# Patient Record
Sex: MALE
[De-identification: names, ages, dates, MRNs, and addresses within clinical notes are randomized per-mention and may not be internally consistent; named-entity substitution may affect disease eponyms.]

---

## 2017-12-28 PROBLEM — Z00.00 ENCOUNTER FOR PREVENTIVE HEALTH EXAMINATION: Status: ACTIVE | Noted: 2017-12-28

## 2018-01-08 ENCOUNTER — APPOINTMENT (OUTPATIENT)
Dept: SURGERY | Facility: CLINIC | Age: 68
End: 2018-01-08
Payer: COMMERCIAL

## 2018-01-08 VITALS
HEART RATE: 64 BPM | DIASTOLIC BLOOD PRESSURE: 79 MMHG | TEMPERATURE: 97.5 F | SYSTOLIC BLOOD PRESSURE: 121 MMHG | OXYGEN SATURATION: 98 % | WEIGHT: 210 LBS | HEIGHT: 68 IN | BODY MASS INDEX: 31.83 KG/M2

## 2018-01-08 DIAGNOSIS — Z86.79 PERSONAL HISTORY OF OTHER DISEASES OF THE CIRCULATORY SYSTEM: ICD-10-CM

## 2018-01-08 DIAGNOSIS — K40.90 UNILATERAL INGUINAL HERNIA, W/OUT OBSTRUCTION OR GANGRENE, NOT SPECIFIED AS RECURRENT: ICD-10-CM

## 2018-01-08 DIAGNOSIS — K46.9 UNSPECIFIED ABDOMINAL HERNIA W/OUT OBSTRUCTION OR GANGRENE: ICD-10-CM

## 2018-01-08 DIAGNOSIS — I50.9 HEART FAILURE, UNSPECIFIED: ICD-10-CM

## 2018-01-08 DIAGNOSIS — F15.90 OTHER STIMULANT USE, UNSPECIFIED, UNCOMPLICATED: ICD-10-CM

## 2018-01-08 DIAGNOSIS — E11.9 TYPE 2 DIABETES MELLITUS W/OUT COMPLICATIONS: ICD-10-CM

## 2018-01-08 PROCEDURE — 99204 OFFICE O/P NEW MOD 45 MIN: CPT

## 2018-01-08 RX ORDER — CARVEDILOL 25 MG/1
25 TABLET, FILM COATED ORAL
Refills: 0 | Status: ACTIVE | COMMUNITY

## 2018-01-08 RX ORDER — ASPIRIN 81 MG/1
81 TABLET, COATED ORAL
Refills: 0 | Status: ACTIVE | COMMUNITY

## 2018-01-08 RX ORDER — METFORMIN HYDROCHLORIDE 1000 MG/1
1000 TABLET, EXTENDED RELEASE ORAL
Refills: 0 | Status: ACTIVE | COMMUNITY

## 2018-01-08 RX ORDER — ATORVASTATIN CALCIUM 80 MG/1
80 TABLET, FILM COATED ORAL
Refills: 0 | Status: ACTIVE | COMMUNITY

## 2018-01-08 RX ORDER — SPIRONOLACTONE 25 MG/1
25 TABLET ORAL
Refills: 0 | Status: ACTIVE | COMMUNITY

## 2018-01-08 RX ORDER — FUROSEMIDE 40 MG/1
40 TABLET ORAL
Refills: 0 | Status: ACTIVE | COMMUNITY

## 2018-01-08 RX ORDER — ENALAPRIL MALEATE 5 MG/1
5 TABLET ORAL
Refills: 0 | Status: ACTIVE | COMMUNITY

## 2018-01-08 RX ORDER — ALLOPURINOL 300 MG/1
300 TABLET ORAL
Refills: 0 | Status: ACTIVE | COMMUNITY

## 2024-07-09 ENCOUNTER — INPATIENT (INPATIENT)
Facility: HOSPITAL | Age: 74
LOS: 2 days | Discharge: HOME | End: 2024-07-12
Attending: STUDENT IN AN ORGANIZED HEALTH CARE EDUCATION/TRAINING PROGRAM | Admitting: STUDENT IN AN ORGANIZED HEALTH CARE EDUCATION/TRAINING PROGRAM
Payer: MEDICARE

## 2024-07-09 VITALS
HEART RATE: 77 BPM | OXYGEN SATURATION: 98 % | TEMPERATURE: 98 F | SYSTOLIC BLOOD PRESSURE: 108 MMHG | DIASTOLIC BLOOD PRESSURE: 74 MMHG | WEIGHT: 229.94 LBS | RESPIRATION RATE: 20 BRPM | HEIGHT: 71 IN

## 2024-07-09 LAB
ALBUMIN SERPL ELPH-MCNC: 3.5 G/DL — SIGNIFICANT CHANGE UP (ref 3.3–5)
ALP SERPL-CCNC: 49 U/L — SIGNIFICANT CHANGE UP (ref 40–120)
ALT FLD-CCNC: 14 U/L — SIGNIFICANT CHANGE UP (ref 12–78)
ANION GAP SERPL CALC-SCNC: 9 MMOL/L — SIGNIFICANT CHANGE UP (ref 5–17)
AST SERPL-CCNC: 14 U/L — LOW (ref 15–37)
BASOPHILS # BLD AUTO: 0.03 K/UL — SIGNIFICANT CHANGE UP (ref 0–0.2)
BASOPHILS NFR BLD AUTO: 0.5 % — SIGNIFICANT CHANGE UP (ref 0–2)
BILIRUB SERPL-MCNC: 0.9 MG/DL — SIGNIFICANT CHANGE UP (ref 0.2–1.2)
BUN SERPL-MCNC: 24 MG/DL — HIGH (ref 7–23)
CALCIUM SERPL-MCNC: 9.1 MG/DL — SIGNIFICANT CHANGE UP (ref 8.5–10.1)
CHLORIDE SERPL-SCNC: 109 MMOL/L — HIGH (ref 96–108)
CO2 SERPL-SCNC: 23 MMOL/L — SIGNIFICANT CHANGE UP (ref 22–31)
CREAT SERPL-MCNC: 1.43 MG/DL — HIGH (ref 0.5–1.3)
EGFR: 52 ML/MIN/1.73M2 — LOW
EOSINOPHIL # BLD AUTO: 0.24 K/UL — SIGNIFICANT CHANGE UP (ref 0–0.5)
EOSINOPHIL NFR BLD AUTO: 4.2 % — SIGNIFICANT CHANGE UP (ref 0–6)
GLUCOSE SERPL-MCNC: 130 MG/DL — HIGH (ref 70–99)
HCT VFR BLD CALC: 38.9 % — LOW (ref 39–50)
HGB BLD-MCNC: 12.9 G/DL — LOW (ref 13–17)
IMM GRANULOCYTES NFR BLD AUTO: 0.3 % — SIGNIFICANT CHANGE UP (ref 0–0.9)
LACTATE SERPL-SCNC: 3 MMOL/L — HIGH (ref 0.7–2)
LACTATE SERPL-SCNC: 3.3 MMOL/L — HIGH (ref 0.7–2)
LYMPHOCYTES # BLD AUTO: 1.52 K/UL — SIGNIFICANT CHANGE UP (ref 1–3.3)
LYMPHOCYTES # BLD AUTO: 26.3 % — SIGNIFICANT CHANGE UP (ref 13–44)
MCHC RBC-ENTMCNC: 30.1 PG — SIGNIFICANT CHANGE UP (ref 27–34)
MCHC RBC-ENTMCNC: 33.2 G/DL — SIGNIFICANT CHANGE UP (ref 32–36)
MCV RBC AUTO: 90.7 FL — SIGNIFICANT CHANGE UP (ref 80–100)
MONOCYTES # BLD AUTO: 0.49 K/UL — SIGNIFICANT CHANGE UP (ref 0–0.9)
MONOCYTES NFR BLD AUTO: 8.5 % — SIGNIFICANT CHANGE UP (ref 2–14)
NEUTROPHILS # BLD AUTO: 3.48 K/UL — SIGNIFICANT CHANGE UP (ref 1.8–7.4)
NEUTROPHILS NFR BLD AUTO: 60.2 % — SIGNIFICANT CHANGE UP (ref 43–77)
NRBC # BLD: 0 /100 WBCS — SIGNIFICANT CHANGE UP (ref 0–0)
NT-PROBNP SERPL-SCNC: 5480 PG/ML — HIGH (ref 0–125)
PLATELET # BLD AUTO: 286 K/UL — SIGNIFICANT CHANGE UP (ref 150–400)
POTASSIUM SERPL-MCNC: 3.7 MMOL/L — SIGNIFICANT CHANGE UP (ref 3.5–5.3)
POTASSIUM SERPL-SCNC: 3.7 MMOL/L — SIGNIFICANT CHANGE UP (ref 3.5–5.3)
PROT SERPL-MCNC: 6.7 GM/DL — SIGNIFICANT CHANGE UP (ref 6–8.3)
RBC # BLD: 4.29 M/UL — SIGNIFICANT CHANGE UP (ref 4.2–5.8)
RBC # FLD: 15.5 % — HIGH (ref 10.3–14.5)
SODIUM SERPL-SCNC: 141 MMOL/L — SIGNIFICANT CHANGE UP (ref 135–145)
TROPONIN I, HIGH SENSITIVITY RESULT: 35.5 NG/L — SIGNIFICANT CHANGE UP
WBC # BLD: 5.78 K/UL — SIGNIFICANT CHANGE UP (ref 3.8–10.5)
WBC # FLD AUTO: 5.78 K/UL — SIGNIFICANT CHANGE UP (ref 3.8–10.5)

## 2024-07-09 PROCEDURE — 93010 ELECTROCARDIOGRAM REPORT: CPT

## 2024-07-09 PROCEDURE — 99285 EMERGENCY DEPT VISIT HI MDM: CPT

## 2024-07-09 PROCEDURE — 71046 X-RAY EXAM CHEST 2 VIEWS: CPT | Mod: 26

## 2024-07-09 PROCEDURE — 99223 1ST HOSP IP/OBS HIGH 75: CPT

## 2024-07-09 RX ORDER — FUROSEMIDE 10 MG/ML
40 INJECTION, SOLUTION INTRAMUSCULAR; INTRAVENOUS ONCE
Refills: 0 | Status: COMPLETED | OUTPATIENT
Start: 2024-07-09 | End: 2024-07-09

## 2024-07-09 RX ORDER — ACETAMINOPHEN 325 MG
650 TABLET ORAL EVERY 6 HOURS
Refills: 0 | Status: DISCONTINUED | OUTPATIENT
Start: 2024-07-09 | End: 2024-07-12

## 2024-07-09 RX ORDER — MAGNESIUM, ALUMINUM HYDROXIDE 400-400
30 TABLET,CHEWABLE ORAL EVERY 4 HOURS
Refills: 0 | Status: DISCONTINUED | OUTPATIENT
Start: 2024-07-09 | End: 2024-07-12

## 2024-07-09 RX ORDER — ONDANSETRON HYDROCHLORIDE 2 MG/ML
4 INJECTION INTRAMUSCULAR; INTRAVENOUS EVERY 8 HOURS
Refills: 0 | Status: DISCONTINUED | OUTPATIENT
Start: 2024-07-09 | End: 2024-07-12

## 2024-07-09 RX ADMIN — FUROSEMIDE 40 MILLIGRAM(S): 10 INJECTION, SOLUTION INTRAMUSCULAR; INTRAVENOUS at 18:06

## 2024-07-09 NOTE — ED PROVIDER NOTE - CLINICAL SUMMARY MEDICAL DECISION MAKING FREE TEXT BOX
72 yo male PMH CHF, HTN, HLD, DM2 presenting to ED c/o bilat LE edema, abd swelling, and RIVAS. States has hx of CHF, recent echo showing EF 25%, states is supposed to be taking torsemide but makes him have to urinate frequently so does not always take it. Denies chest pain, palpitations, fevers/chills, nausea/vomiting, abd pain, lightheadedness/dizziness, LOC. On exam RRR, +S1/S2, +2 bilat LE edema, abd soft, round, not distended, nontender on palpation, no palpable masses, l/s equal and clr bilat. Will obtain EKG/trop/BNP to assess ACS/arrhythmia/CHF. Most likely fluid overload 2/2 to not taking his diuretics. will obtain cmp to assess electrolyte imbalance, cbc for leukocytosis/anemia. Will order lasix and reassess. Dispo pending results.

## 2024-07-09 NOTE — ED PROVIDER NOTE - PHYSICAL EXAMINATION
CONSTITUTIONAL: Appears well, in no apparent distress  HEAD: Normocephalic, no obvious signs of trauma  EENT: PERRL, EOMI w/o pain, nares patent no drainage, no pharyngeal erythema, swelling, or exudates  NECK: Trachea midline, no goiter  RESP: L/S equal clr, bilat, apices and bases, no accessory muscle use, speaking full sentences  CARDIC: RRR, +S1/S2, no peripheral edema  GI: ABD soft, nondistended, nontender on palpation, no palpable masses, negative Escobedo's Sign  : No CVA Tenderness  MSK: 5/5 strength extremities x 4, full ROM without pain, no spinal tenderness on palpation  NEURO: A&OX4, No focal motor deficits/weakness, no sensory deficits, no slurred speech, no facial droop, normal gait  Psych: Appropriate mood/affect, no SI/HI CONSTITUTIONAL: Appears well, in no apparent distress  HEAD: Normocephalic, no obvious signs of trauma  EENT: PERRL, EOMI w/o pain, nares patent no drainage, no pharyngeal erythema, swelling, or exudates  NECK: Trachea midline, no goiter  RESP: L/S equal clr, bilat, apices and bases, no accessory muscle use, speaking full sentences  CARDIC: RRR, +S1/S2, +2 bilat LE edema  GI: ABD soft, round, nondistended, nontender on palpation, no palpable masses  MSK: 5/5 strength extremities x 4, full ROM without pain, no spinal tenderness on palpation  NEURO: A&OX4, No focal motor deficits/weakness, no sensory deficits, no slurred speech, no facial droop, normal gait

## 2024-07-09 NOTE — ED PROVIDER NOTE - OBJECTIVE STATEMENT
72 yo male PMH CHF, HTN, HLD, DM2 presenting to ED c/o bilat LE edema and RIVAS 72 yo male PMH CHF, HTN, HLD, DM2 presenting to ED c/o bilat LE edema, abd swelling, and RIVAS. States has hx of CHF, recent echo showing EF 25%, states is supposed to be taking torsemide but makes him have to urinate frequently so does not always take it. Denies chest pain, palpitations, fevers/chills, nausea/vomiting, abd pain, lightheadedness/dizziness, LOC.

## 2024-07-09 NOTE — ED PROVIDER NOTE - ED STEMI HIDDEN
hide Ivermectin Counseling:  Patient instructed to take medication on an empty stomach with a full glass of water.  Patient informed of potential adverse effects including but not limited to nausea, diarrhea, dizziness, itching, and swelling of the extremities or lymph nodes.  The patient verbalized understanding of the proper use and possible adverse effects of ivermectin.  All of the patient's questions and concerns were addressed.

## 2024-07-09 NOTE — PATIENT PROFILE ADULT - NSPROMEDSADMININFO_GEN_A_NUR
Please return to ED if Terence is having trouble walking, fever, increasing swelling and pain not relieved with tylenol/ motrin or if Terence is having similar rashes or f you have any other concerns.  Please give zyrtec as instructed for itching and continue applying hydrocortisone to the bite for next 2 days until the swelling starts coming down   no concerns

## 2024-07-09 NOTE — ED PROVIDER NOTE - PROGRESS NOTE DETAILS
Joyce: Pt care signed out to me at change of shift, pending rpt lactate and re-eval s/p Lasix. Rpt lactate 3.0 (down trending from prior value). On re-eval, resting comfortably. Shared decision making w/ pt, pt states he would be more comfortable w/ admission. Will admit to Tele (d/w Dr Hill) for CHF exacerbation. Pt, family updated to results, admission. They understand / agree w/ this plan.

## 2024-07-09 NOTE — ED ADULT NURSE NOTE - ED STAT RN HANDOFF DETAILS
Report endorsed to oncoming RN  Nichelle for my break coverage. Report given to covering RN and patient informed during rounding Safety checks compld this shift/Safety rounds completed hourly.  Fall +skin precs in place. Any issues endorsed to oncoming RN for follow up. RN Assumed patient's care for coverage.

## 2024-07-09 NOTE — ED ADULT NURSE NOTE - OBJECTIVE STATEMENT
A&OX4 patient C/O SOB/ b/l leg swelling/ productive cough with clear phlegm denies fevers last seen in ED 2X weeks ago. patient states only took water pill 2x since discharge. pmh HF/ HTN/DM2/ HLD

## 2024-07-09 NOTE — ED PROVIDER NOTE - ATTENDING APP SHARED VISIT CONTRIBUTION OF CARE
73M pmhx HTN, DM, CHF, who presents for evaluation of chronic progressive b/l lower extremity edema and sensation of abd fullness and shortness of breath with ambulation - states he does not regularly take his torsemide 20mg due to it causing urinary urgency. Denies recent illness, abd pain or chest pain. States follows with cardiologist in [facility]  On exam pt is aox3, nad, heart rrr, lungs cta b/l, abd soft ntnd, + pitting edema to b/l anterior shins  plan - check troponin to assess for cardiac strain, check bnp, check cxr to assess for signs of pulm edema, clinical mild fluid overload with normal work of breathing, on room air. trial dose of lasix and re-eval for improvement, if still sob with exertion or not improved, will consider admission for continued diuresis , signed out to afternoon team for re-eval.

## 2024-07-09 NOTE — ED ADULT NURSE NOTE - ED STAT RN HANDOFF DETAILS 2
Report given to Ivette HAMMOND 1C pt aox3 vss iv site patent no s/s infection/infiltration pt denies pain skin intact family at bedside

## 2024-07-09 NOTE — PATIENT PROFILE ADULT - FALL HARM RISK - HARM RISK INTERVENTIONS

## 2024-07-10 ENCOUNTER — RESULT REVIEW (OUTPATIENT)
Age: 74
End: 2024-07-10

## 2024-07-10 DIAGNOSIS — I10 ESSENTIAL (PRIMARY) HYPERTENSION: ICD-10-CM

## 2024-07-10 DIAGNOSIS — I50.9 HEART FAILURE, UNSPECIFIED: ICD-10-CM

## 2024-07-10 DIAGNOSIS — N40.0 BENIGN PROSTATIC HYPERPLASIA WITHOUT LOWER URINARY TRACT SYMPTOMS: ICD-10-CM

## 2024-07-10 DIAGNOSIS — Z29.9 ENCOUNTER FOR PROPHYLACTIC MEASURES, UNSPECIFIED: ICD-10-CM

## 2024-07-10 DIAGNOSIS — E11.9 TYPE 2 DIABETES MELLITUS WITHOUT COMPLICATIONS: ICD-10-CM

## 2024-07-10 DIAGNOSIS — E78.5 HYPERLIPIDEMIA, UNSPECIFIED: ICD-10-CM

## 2024-07-10 DIAGNOSIS — N39.0 URINARY TRACT INFECTION, SITE NOT SPECIFIED: ICD-10-CM

## 2024-07-10 DIAGNOSIS — N18.9 CHRONIC KIDNEY DISEASE, UNSPECIFIED: ICD-10-CM

## 2024-07-10 LAB
A1C WITH ESTIMATED AVERAGE GLUCOSE RESULT: 8.3 % — HIGH (ref 4–5.6)
ALBUMIN SERPL ELPH-MCNC: 3.8 G/DL — SIGNIFICANT CHANGE UP (ref 3.3–5)
ALBUMIN, RANDOM URINE: 3.9 MG/DL — SIGNIFICANT CHANGE UP
ALBUMIN/CREATININE RATIO (ACR): 75 MG/G — HIGH (ref 0–30)
ALP SERPL-CCNC: 63 U/L — SIGNIFICANT CHANGE UP (ref 40–120)
ALT FLD-CCNC: 16 U/L — SIGNIFICANT CHANGE UP (ref 12–78)
ANION GAP SERPL CALC-SCNC: 7 MMOL/L — SIGNIFICANT CHANGE UP (ref 5–17)
ANION GAP SERPL CALC-SCNC: 9 MMOL/L — SIGNIFICANT CHANGE UP (ref 5–17)
APPEARANCE UR: CLEAR — SIGNIFICANT CHANGE UP
AST SERPL-CCNC: 17 U/L — SIGNIFICANT CHANGE UP (ref 15–37)
BASOPHILS # BLD AUTO: 0.04 K/UL — SIGNIFICANT CHANGE UP (ref 0–0.2)
BASOPHILS NFR BLD AUTO: 0.5 % — SIGNIFICANT CHANGE UP (ref 0–2)
BILIRUB SERPL-MCNC: 1 MG/DL — SIGNIFICANT CHANGE UP (ref 0.2–1.2)
BILIRUB UR-MCNC: NEGATIVE — SIGNIFICANT CHANGE UP
BUN SERPL-MCNC: 23 MG/DL — SIGNIFICANT CHANGE UP (ref 7–23)
BUN SERPL-MCNC: 24 MG/DL — HIGH (ref 7–23)
CALCIUM SERPL-MCNC: 9.5 MG/DL — SIGNIFICANT CHANGE UP (ref 8.5–10.1)
CALCIUM SERPL-MCNC: 9.7 MG/DL — SIGNIFICANT CHANGE UP (ref 8.5–10.1)
CHLORIDE SERPL-SCNC: 107 MMOL/L — SIGNIFICANT CHANGE UP (ref 96–108)
CHLORIDE SERPL-SCNC: 108 MMOL/L — SIGNIFICANT CHANGE UP (ref 96–108)
CHOLEST SERPL-MCNC: 77 MG/DL — SIGNIFICANT CHANGE UP
CO2 SERPL-SCNC: 23 MMOL/L — SIGNIFICANT CHANGE UP (ref 22–31)
CO2 SERPL-SCNC: 24 MMOL/L — SIGNIFICANT CHANGE UP (ref 22–31)
COLOR SPEC: YELLOW — SIGNIFICANT CHANGE UP
CREAT ?TM UR-MCNC: 52 MG/DL — SIGNIFICANT CHANGE UP
CREAT SERPL-MCNC: 1.23 MG/DL — SIGNIFICANT CHANGE UP (ref 0.5–1.3)
CREAT SERPL-MCNC: 1.23 MG/DL — SIGNIFICANT CHANGE UP (ref 0.5–1.3)
DIFF PNL FLD: NEGATIVE — SIGNIFICANT CHANGE UP
EGFR: 62 ML/MIN/1.73M2 — SIGNIFICANT CHANGE UP
EGFR: 62 ML/MIN/1.73M2 — SIGNIFICANT CHANGE UP
EOSINOPHIL # BLD AUTO: 0.28 K/UL — SIGNIFICANT CHANGE UP (ref 0–0.5)
EOSINOPHIL NFR BLD AUTO: 3.6 % — SIGNIFICANT CHANGE UP (ref 0–6)
ESTIMATED AVERAGE GLUCOSE: 192 MG/DL — HIGH (ref 68–114)
GLUCOSE BLDC GLUCOMTR-MCNC: 130 MG/DL — HIGH (ref 70–99)
GLUCOSE BLDC GLUCOMTR-MCNC: 133 MG/DL — HIGH (ref 70–99)
GLUCOSE BLDC GLUCOMTR-MCNC: 133 MG/DL — HIGH (ref 70–99)
GLUCOSE BLDC GLUCOMTR-MCNC: 185 MG/DL — HIGH (ref 70–99)
GLUCOSE SERPL-MCNC: 156 MG/DL — HIGH (ref 70–99)
GLUCOSE SERPL-MCNC: 157 MG/DL — HIGH (ref 70–99)
GLUCOSE UR QL: NEGATIVE MG/DL — SIGNIFICANT CHANGE UP
HCT VFR BLD CALC: 42.4 % — SIGNIFICANT CHANGE UP (ref 39–50)
HDLC SERPL-MCNC: 29 MG/DL — LOW
HGB BLD-MCNC: 13.7 G/DL — SIGNIFICANT CHANGE UP (ref 13–17)
IMM GRANULOCYTES NFR BLD AUTO: 0.4 % — SIGNIFICANT CHANGE UP (ref 0–0.9)
KETONES UR-MCNC: NEGATIVE MG/DL — SIGNIFICANT CHANGE UP
LACTATE SERPL-SCNC: 1.7 MMOL/L — SIGNIFICANT CHANGE UP (ref 0.7–2)
LEUKOCYTE ESTERASE UR-ACNC: NEGATIVE — SIGNIFICANT CHANGE UP
LIPID PNL WITH DIRECT LDL SERPL: 30 MG/DL — SIGNIFICANT CHANGE UP
LYMPHOCYTES # BLD AUTO: 1.11 K/UL — SIGNIFICANT CHANGE UP (ref 1–3.3)
LYMPHOCYTES # BLD AUTO: 14.1 % — SIGNIFICANT CHANGE UP (ref 13–44)
MAGNESIUM SERPL-MCNC: 1 MG/DL — CRITICAL LOW (ref 1.6–2.6)
MCHC RBC-ENTMCNC: 29.6 PG — SIGNIFICANT CHANGE UP (ref 27–34)
MCHC RBC-ENTMCNC: 32.3 G/DL — SIGNIFICANT CHANGE UP (ref 32–36)
MCV RBC AUTO: 91.6 FL — SIGNIFICANT CHANGE UP (ref 80–100)
MONOCYTES # BLD AUTO: 0.56 K/UL — SIGNIFICANT CHANGE UP (ref 0–0.9)
MONOCYTES NFR BLD AUTO: 7.1 % — SIGNIFICANT CHANGE UP (ref 2–14)
NEUTROPHILS # BLD AUTO: 5.86 K/UL — SIGNIFICANT CHANGE UP (ref 1.8–7.4)
NEUTROPHILS NFR BLD AUTO: 74.3 % — SIGNIFICANT CHANGE UP (ref 43–77)
NITRITE UR-MCNC: NEGATIVE — SIGNIFICANT CHANGE UP
NON HDL CHOLESTEROL: 47 MG/DL — SIGNIFICANT CHANGE UP
NRBC # BLD: 0 /100 WBCS — SIGNIFICANT CHANGE UP (ref 0–0)
PH UR: 5.5 — SIGNIFICANT CHANGE UP (ref 5–8)
PHOSPHATE SERPL-MCNC: 3.2 MG/DL — SIGNIFICANT CHANGE UP (ref 2.5–4.5)
PLATELET # BLD AUTO: 278 K/UL — SIGNIFICANT CHANGE UP (ref 150–400)
POTASSIUM SERPL-MCNC: 3.5 MMOL/L — SIGNIFICANT CHANGE UP (ref 3.5–5.3)
POTASSIUM SERPL-MCNC: 3.7 MMOL/L — SIGNIFICANT CHANGE UP (ref 3.5–5.3)
POTASSIUM SERPL-SCNC: 3.5 MMOL/L — SIGNIFICANT CHANGE UP (ref 3.5–5.3)
POTASSIUM SERPL-SCNC: 3.7 MMOL/L — SIGNIFICANT CHANGE UP (ref 3.5–5.3)
PROT SERPL-MCNC: 7 GM/DL — SIGNIFICANT CHANGE UP (ref 6–8.3)
PROT UR-MCNC: NEGATIVE MG/DL — SIGNIFICANT CHANGE UP
RBC # BLD: 4.63 M/UL — SIGNIFICANT CHANGE UP (ref 4.2–5.8)
RBC # FLD: 15.6 % — HIGH (ref 10.3–14.5)
SODIUM SERPL-SCNC: 138 MMOL/L — SIGNIFICANT CHANGE UP (ref 135–145)
SODIUM SERPL-SCNC: 140 MMOL/L — SIGNIFICANT CHANGE UP (ref 135–145)
SP GR SPEC: 1.01 — SIGNIFICANT CHANGE UP (ref 1–1.03)
TRIGL SERPL-MCNC: 87 MG/DL — SIGNIFICANT CHANGE UP
UROBILINOGEN FLD QL: 0.2 MG/DL — SIGNIFICANT CHANGE UP (ref 0.2–1)
WBC # BLD: 7.88 K/UL — SIGNIFICANT CHANGE UP (ref 3.8–10.5)
WBC # FLD AUTO: 7.88 K/UL — SIGNIFICANT CHANGE UP (ref 3.8–10.5)

## 2024-07-10 PROCEDURE — 93306 TTE W/DOPPLER COMPLETE: CPT | Mod: 26

## 2024-07-10 PROCEDURE — 99223 1ST HOSP IP/OBS HIGH 75: CPT

## 2024-07-10 PROCEDURE — 74176 CT ABD & PELVIS W/O CONTRAST: CPT | Mod: 26

## 2024-07-10 PROCEDURE — 99232 SBSQ HOSP IP/OBS MODERATE 35: CPT

## 2024-07-10 PROCEDURE — 93356 MYOCRD STRAIN IMG SPCKL TRCK: CPT

## 2024-07-10 RX ORDER — ATORVASTATIN CALCIUM 20 MG/1
80 TABLET, FILM COATED ORAL AT BEDTIME
Refills: 0 | Status: DISCONTINUED | OUTPATIENT
Start: 2024-07-10 | End: 2024-07-12

## 2024-07-10 RX ORDER — CEFEPIME 1 G/1
1000 INJECTION, POWDER, FOR SOLUTION INTRAMUSCULAR; INTRAVENOUS EVERY 8 HOURS
Refills: 0 | Status: DISCONTINUED | OUTPATIENT
Start: 2024-07-10 | End: 2024-07-12

## 2024-07-10 RX ORDER — TRAMADOL HYDROCHLORIDE 50 MG/1
1 TABLET, FILM COATED ORAL
Refills: 0 | DISCHARGE

## 2024-07-10 RX ORDER — METFORMIN HYDROCHLORIDE 850 MG/1
1 TABLET, FILM COATED ORAL
Refills: 0 | DISCHARGE

## 2024-07-10 RX ORDER — GLIPIZIDE 5 MG
1 TABLET ORAL
Refills: 0 | DISCHARGE

## 2024-07-10 RX ORDER — DEXTROSE MONOHYDRATE AND SODIUM CHLORIDE 5; .3 G/100ML; G/100ML
1000 INJECTION, SOLUTION INTRAVENOUS
Refills: 0 | Status: DISCONTINUED | OUTPATIENT
Start: 2024-07-10 | End: 2024-07-12

## 2024-07-10 RX ORDER — CEFEPIME 1 G/1
INJECTION, POWDER, FOR SOLUTION INTRAMUSCULAR; INTRAVENOUS
Refills: 0 | Status: DISCONTINUED | OUTPATIENT
Start: 2024-07-10 | End: 2024-07-12

## 2024-07-10 RX ORDER — SENNOSIDES 8.6 MG
2 TABLET ORAL AT BEDTIME
Refills: 0 | Status: DISCONTINUED | OUTPATIENT
Start: 2024-07-10 | End: 2024-07-12

## 2024-07-10 RX ORDER — DEXTROSE 30 % IN WATER 30 %
25 VIAL (ML) INTRAVENOUS ONCE
Refills: 0 | Status: DISCONTINUED | OUTPATIENT
Start: 2024-07-10 | End: 2024-07-12

## 2024-07-10 RX ORDER — FUROSEMIDE 10 MG/ML
60 INJECTION, SOLUTION INTRAMUSCULAR; INTRAVENOUS
Refills: 0 | Status: DISCONTINUED | OUTPATIENT
Start: 2024-07-10 | End: 2024-07-12

## 2024-07-10 RX ORDER — EMPAGLIFLOZIN 25 MG/1
1 TABLET, FILM COATED ORAL
Refills: 0 | DISCHARGE

## 2024-07-10 RX ORDER — ALLOPURINOL 300 MG/1
300 TABLET ORAL DAILY
Refills: 0 | Status: DISCONTINUED | OUTPATIENT
Start: 2024-07-10 | End: 2024-07-12

## 2024-07-10 RX ORDER — CEFEPIME 1 G/1
1000 INJECTION, POWDER, FOR SOLUTION INTRAMUSCULAR; INTRAVENOUS ONCE
Refills: 0 | Status: COMPLETED | OUTPATIENT
Start: 2024-07-10 | End: 2024-07-10

## 2024-07-10 RX ORDER — TAMSULOSIN HYDROCHLORIDE 0.4 MG/1
0.4 CAPSULE ORAL AT BEDTIME
Refills: 0 | Status: DISCONTINUED | OUTPATIENT
Start: 2024-07-10 | End: 2024-07-12

## 2024-07-10 RX ORDER — BISACODYL 5 MG
10 TABLET, DELAYED RELEASE (ENTERIC COATED) ORAL DAILY
Refills: 0 | Status: DISCONTINUED | OUTPATIENT
Start: 2024-07-10 | End: 2024-07-12

## 2024-07-10 RX ORDER — GLUCAGON HYDROCHLORIDE 1 MG/ML
1 INJECTION, POWDER, FOR SOLUTION INTRAMUSCULAR; INTRAVENOUS; SUBCUTANEOUS ONCE
Refills: 0 | Status: DISCONTINUED | OUTPATIENT
Start: 2024-07-10 | End: 2024-07-12

## 2024-07-10 RX ORDER — SPIRONOLACTONE 25 MG/1
25 TABLET ORAL DAILY
Refills: 0 | Status: DISCONTINUED | OUTPATIENT
Start: 2024-07-10 | End: 2024-07-12

## 2024-07-10 RX ORDER — MAGNESIUM SULFATE 100 %
2 POWDER (GRAM) MISCELLANEOUS ONCE
Refills: 0 | Status: COMPLETED | OUTPATIENT
Start: 2024-07-10 | End: 2024-07-10

## 2024-07-10 RX ORDER — CARVEDILOL PHOSPHATE 80 MG/1
25 CAPSULE, EXTENDED RELEASE ORAL EVERY 12 HOURS
Refills: 0 | Status: DISCONTINUED | OUTPATIENT
Start: 2024-07-10 | End: 2024-07-12

## 2024-07-10 RX ORDER — INSULIN LISPRO 100 [IU]/ML
INJECTION, SOLUTION SUBCUTANEOUS AT BEDTIME
Refills: 0 | Status: DISCONTINUED | OUTPATIENT
Start: 2024-07-10 | End: 2024-07-12

## 2024-07-10 RX ORDER — TORSEMIDE 20 MG/1
1 TABLET ORAL
Refills: 0 | DISCHARGE

## 2024-07-10 RX ORDER — DEXTROSE 30 % IN WATER 30 %
15 VIAL (ML) INTRAVENOUS ONCE
Refills: 0 | Status: DISCONTINUED | OUTPATIENT
Start: 2024-07-10 | End: 2024-07-12

## 2024-07-10 RX ORDER — INSULIN LISPRO 100 [IU]/ML
INJECTION, SOLUTION SUBCUTANEOUS
Refills: 0 | Status: DISCONTINUED | OUTPATIENT
Start: 2024-07-10 | End: 2024-07-12

## 2024-07-10 RX ORDER — HEPARIN SODIUM 50 [USP'U]/ML
5000 INJECTION, SOLUTION INTRAVENOUS EVERY 12 HOURS
Refills: 0 | Status: DISCONTINUED | OUTPATIENT
Start: 2024-07-10 | End: 2024-07-12

## 2024-07-10 RX ORDER — FUROSEMIDE 10 MG/ML
20 INJECTION, SOLUTION INTRAMUSCULAR; INTRAVENOUS EVERY 12 HOURS
Refills: 0 | Status: DISCONTINUED | OUTPATIENT
Start: 2024-07-10 | End: 2024-07-10

## 2024-07-10 RX ORDER — POLYETHYLENE GLYCOL 3350 1 G/G
17 POWDER ORAL
Refills: 0 | Status: DISCONTINUED | OUTPATIENT
Start: 2024-07-10 | End: 2024-07-12

## 2024-07-10 RX ORDER — SACUBITRIL AND VALSARTAN 97; 103 MG/1; MG/1
1 TABLET, FILM COATED ORAL
Refills: 0 | Status: DISCONTINUED | OUTPATIENT
Start: 2024-07-10 | End: 2024-07-12

## 2024-07-10 RX ORDER — DEXTROSE 30 % IN WATER 30 %
12.5 VIAL (ML) INTRAVENOUS ONCE
Refills: 0 | Status: DISCONTINUED | OUTPATIENT
Start: 2024-07-10 | End: 2024-07-12

## 2024-07-10 RX ADMIN — SACUBITRIL AND VALSARTAN 1 TABLET(S): 97; 103 TABLET, FILM COATED ORAL at 18:37

## 2024-07-10 RX ADMIN — Medication 25 GRAM(S): at 10:09

## 2024-07-10 RX ADMIN — ALLOPURINOL 300 MILLIGRAM(S): 300 TABLET ORAL at 12:23

## 2024-07-10 RX ADMIN — ATORVASTATIN CALCIUM 80 MILLIGRAM(S): 20 TABLET, FILM COATED ORAL at 22:20

## 2024-07-10 RX ADMIN — CEFEPIME 100 MILLIGRAM(S): 1 INJECTION, POWDER, FOR SOLUTION INTRAMUSCULAR; INTRAVENOUS at 08:52

## 2024-07-10 RX ADMIN — SACUBITRIL AND VALSARTAN 1 TABLET(S): 97; 103 TABLET, FILM COATED ORAL at 06:10

## 2024-07-10 RX ADMIN — Medication 25 GRAM(S): at 16:10

## 2024-07-10 RX ADMIN — FUROSEMIDE 20 MILLIGRAM(S): 10 INJECTION, SOLUTION INTRAMUSCULAR; INTRAVENOUS at 06:11

## 2024-07-10 RX ADMIN — CEFEPIME 100 MILLIGRAM(S): 1 INJECTION, POWDER, FOR SOLUTION INTRAMUSCULAR; INTRAVENOUS at 22:21

## 2024-07-10 RX ADMIN — SPIRONOLACTONE 25 MILLIGRAM(S): 25 TABLET ORAL at 06:10

## 2024-07-10 RX ADMIN — TAMSULOSIN HYDROCHLORIDE 0.4 MILLIGRAM(S): 0.4 CAPSULE ORAL at 22:20

## 2024-07-10 RX ADMIN — HEPARIN SODIUM 5000 UNIT(S): 50 INJECTION, SOLUTION INTRAVENOUS at 18:37

## 2024-07-10 RX ADMIN — CARVEDILOL PHOSPHATE 25 MILLIGRAM(S): 80 CAPSULE, EXTENDED RELEASE ORAL at 18:37

## 2024-07-10 RX ADMIN — FUROSEMIDE 60 MILLIGRAM(S): 10 INJECTION, SOLUTION INTRAMUSCULAR; INTRAVENOUS at 18:38

## 2024-07-10 RX ADMIN — HEPARIN SODIUM 5000 UNIT(S): 50 INJECTION, SOLUTION INTRAVENOUS at 06:11

## 2024-07-10 RX ADMIN — CARVEDILOL PHOSPHATE 25 MILLIGRAM(S): 80 CAPSULE, EXTENDED RELEASE ORAL at 06:10

## 2024-07-10 RX ADMIN — CEFEPIME 100 MILLIGRAM(S): 1 INJECTION, POWDER, FOR SOLUTION INTRAMUSCULAR; INTRAVENOUS at 17:18

## 2024-07-10 RX ADMIN — Medication 10 MILLIGRAM(S): at 12:23

## 2024-07-10 RX ADMIN — Medication 5 MILLIGRAM(S): at 12:23

## 2024-07-10 NOTE — H&P ADULT - NSICDXPASTMEDICALHX_GEN_ALL_CORE_FT
PAST MEDICAL HISTORY:  Acute on chronic systolic congestive heart failure     BPH (benign prostatic hyperplasia)     DM (diabetes mellitus)     HLD (hyperlipidemia)     HTN (hypertension)

## 2024-07-10 NOTE — H&P ADULT - NSHPPHYSICALEXAM_GEN_ALL_CORE
T(C): 36.9 (07-09-24 @ 23:34), Max: 36.9 (07-09-24 @ 23:34)  HR: 81 (07-09-24 @ 23:34) (72 - 81)  BP: 108/75 (07-09-24 @ 23:34) (108/74 - 109/80)  RR: 20 (07-09-24 @ 23:34) (18 - 20)  SpO2: 96% (07-09-24 @ 23:34) (96% - 98%)    General: non-toxic  HEENT: non-traumatic, perrla, eomi  Cardio: s1s2 regular rate and rhythm  Lungs: comfortable breathing, clear to auscultation  Abdomen: Soft, non-tender, non-distended  Neuro: AOx4  Ext: Pulses +2

## 2024-07-10 NOTE — H&P ADULT - PROBLEM SELECTOR PLAN 2
Controlled  C/w home meds Cr 1.43 on admission  Baseline unknown  Monitor Cr closely while on diuretics  avoid nephrotoxins.   renally dose medications

## 2024-07-10 NOTE — CHART NOTE - NSCHARTNOTEFT_GEN_A_CORE
CT abdomen findings noted this am, Patient UA ordered earlier, still pending, he has no abdominal tenderness.  Septic work up ordered and started on empiric cefepime.

## 2024-07-10 NOTE — H&P ADULT - HISTORY OF PRESENT ILLNESS
74 yo male PMH CHF, HTN, HLD, DM2 presenting to ED c/o bilat LE edema, abd swelling, and RIVAS x 3 days. Patient was in a different hospital in ED 2 weeks ago with similar symptoms and was prescribed torsemide. He took torsemide for one day but was unable to tolerate due to lower abdominal cramps and stopped taking torsemide. Started having exertional dyspnea and LE edema worsening for the past 3 days and came to ED for further eval. Received 40 IVP in ED, but has had difficulty urinating. Reports he has gone to the bathroom multiple times, has the urge to urinate but is unable to. Also reports simultaneous BM while he's trying to strain to urinate, reports stool comes out in small soni. No other complaints. ROS otherwise benign. Admitted for CHF exacerbation.

## 2024-07-10 NOTE — PROGRESS NOTE ADULT - PROBLEM SELECTOR PLAN 1
BUN/Cr - 24/1.43 | BNP 5480  ECG - NSR, nonspecific TW abn, QTc 446  Start lasix 20 md IVP BID  C/w GDMT - aldactone, entresto, coreg  Repeat ECHO  Monitor Cr closely while on diuretics.   daily weight, strict ins and outs Will c/w Cefepime  - F/u blood cx

## 2024-07-10 NOTE — H&P ADULT - PROBLEM SELECTOR PLAN 1
BUN/Cr - 24/1.43 | BNP 5480  ECG - NSR, nonspecific TW abn, QTc 446  Start lasix 20 md IVP BID  C/w GDMT - aldactone, entresto, coreg  Repeat ECHO  Obtain CT abdomen non con  daily weight, strict ins and outs BUN/Cr - 24/1.43 | BNP 5480  ECG - NSR, nonspecific TW abn, QTc 446  Start lasix 20 md IVP BID  C/w GDMT - aldactone, entresto, coreg  Repeat ECHO  Obtain CT abdomen non con  Monitor Cr closely while on diuretics.   daily weight, strict ins and outs

## 2024-07-10 NOTE — PROGRESS NOTE ADULT - PROBLEM SELECTOR PLAN 7
DVT ppx: HepsubQ  Fall and aspiration precautions. c/w home meds  bladder scan    Reviewed CT A/P findings concerning for metastatic prostate cancer  - Heme/onc c/s placed c/w home meds  bladder scan    Reviewed CT A/P findings concerning for metastatic prostate cancer. Findings discussed with patient on 7/10  - CT chest pending  - Heme/onc c/s placed

## 2024-07-10 NOTE — PROGRESS NOTE ADULT - SUBJECTIVE AND OBJECTIVE BOX
Patient is a 73y old  Male who presents with a chief complaint of     INTERVAL HPI/OVERNIGHT EVENTS: No acute events overnight. HD stable.     MEDICATIONS  (STANDING):  allopurinol 300 milliGRAM(s) Oral daily  atorvastatin 80 milliGRAM(s) Oral at bedtime  bisacodyl Suppository 10 milliGRAM(s) Rectal daily  carvedilol 25 milliGRAM(s) Oral every 12 hours  cefepime   IVPB 1000 milliGRAM(s) IV Intermittent every 8 hours  cefepime   IVPB      dextrose 5%. 1000 milliLiter(s) (100 mL/Hr) IV Continuous <Continuous>  dextrose 5%. 1000 milliLiter(s) (50 mL/Hr) IV Continuous <Continuous>  dextrose 50% Injectable 12.5 Gram(s) IV Push once  dextrose 50% Injectable 25 Gram(s) IV Push once  dextrose 50% Injectable 25 Gram(s) IV Push once  finasteride 5 milliGRAM(s) Oral daily  furosemide   Injectable 20 milliGRAM(s) IV Push every 12 hours  glucagon  Injectable 1 milliGRAM(s) IntraMuscular once  heparin   Injectable 5000 Unit(s) SubCutaneous every 12 hours  insulin lispro (ADMELOG) corrective regimen sliding scale   SubCutaneous three times a day before meals  insulin lispro (ADMELOG) corrective regimen sliding scale   SubCutaneous at bedtime  sacubitril 97 mG/valsartan 103 mG 1 Tablet(s) Oral two times a day  senna 2 Tablet(s) Oral at bedtime  spironolactone 25 milliGRAM(s) Oral daily  tamsulosin 0.4 milliGRAM(s) Oral at bedtime    MEDICATIONS  (PRN):  acetaminophen     Tablet .. 650 milliGRAM(s) Oral every 6 hours PRN Temp greater or equal to 38C (100.4F), Mild Pain (1 - 3)  aluminum hydroxide/magnesium hydroxide/simethicone Suspension 30 milliLiter(s) Oral every 4 hours PRN Dyspepsia  dextrose Oral Gel 15 Gram(s) Oral once PRN Blood Glucose LESS THAN 70 milliGRAM(s)/deciliter  melatonin 3 milliGRAM(s) Oral at bedtime PRN Insomnia  ondansetron Injectable 4 milliGRAM(s) IV Push every 8 hours PRN Nausea and/or Vomiting  polyethylene glycol 3350 17 Gram(s) Oral two times a day PRN Constipation      Allergies    No Known Allergies    Intolerances        REVIEW OF SYSTEMS: all negative with exception of above    Vital Signs Last 24 Hrs  T(C): 36.9 (10 Jul 2024 14:29), Max: 36.9 (2024 23:34)  T(F): 98.4 (10 Jul 2024 14:29), Max: 98.4 (2024 23:34)  HR: 76 (10 Jul 2024 14:29) (72 - 91)  BP: 102/69 (10 Jul 2024 14:) (102/69 - 132/84)  BP(mean): --  RR: 18 (10 Jul 2024 14:) (18 - 20)  SpO2: 95% (10 Jul 2024 14:) (95% - 98%)    Parameters below as of 10 Jul 2024 14:  Patient On (Oxygen Delivery Method): room air        PHYSICAL EXAM:  GENERAL: NAD, well-groomed  NERVOUS SYSTEM:  Alert & Oriented X3, Good concentration; Motor Strength 5/5 B/L upper and lower extremities; DTRs 2+ intact and symmetric  CHEST/LUNG: Clear to percussion bilaterally; No rales, rhonchi, wheezing, or rubs  HEART: Regular rate and rhythm; No murmurs, rubs, or gallops  ABDOMEN: Soft, Nontender, Nondistended; Bowel sounds present  EXTREMITIES:  2+ Peripheral Pulses, No clubbing, cyanosis, or edema    LABS:                        13.7   7.88  )-----------( 278      ( 10 Jul 2024 06:58 )             42.4     07-10    138  |  108  |  24<H>  ----------------------------<  156<H>  3.7   |  23  |  1.23    Ca    9.7      10 Jul 2024 06:58  Phos  3.2     07-10  Mg     1.0     07-10    TPro  7.0  /  Alb  3.8  /  TBili  1.0  /  DBili  x   /  AST  17  /  ALT  16  /  AlkPhos  63  07-10      Urinalysis Basic - ( 10 Jul 2024 07:00 )    Color: Yellow / Appearance: Clear / S.011 / pH: x  Gluc: x / Ketone: Negative mg/dL  / Bili: Negative / Urobili: 0.2 mg/dL   Blood: x / Protein: Negative mg/dL / Nitrite: Negative   Leuk Esterase: Negative / RBC: x / WBC x   Sq Epi: x / Non Sq Epi: x / Bacteria: x      CAPILLARY BLOOD GLUCOSE      POCT Blood Glucose.: 130 mg/dL (10 Jul 2024 12:19)  POCT Blood Glucose.: 133 mg/dL (10 Jul 2024 08:48)      RADIOLOGY & ADDITIONAL TESTS:    Imaging Personally Reviewed:  [ ] YES  [ ] NO  < from: CT Abdomen and Pelvis No Cont (07.10.24 @ 06:40) >  IMPRESSION:  1.  Concentric urinary bladder mural thickening and adjacent moderate   perivesicular fat stranding. Correlate with urinalysis as cystitis   suspected  2.  Marked prostate enlargement (approx 225 cc)  3.  Sclerotic L2 vertebral body (ivory vertebrae). Prostate metastasis   favored over pagetoid change.  4.  2 cm LEFT bladder base calculus  5.  Centrally thickened appendix . Correlation with normal RLQ physical   exam as early appendicitis is unlikely but not excluded.  6.  Contracted gallbladder (without stones) and with trace   pericholecystic edema, nonspecific      This report was discussed with Stephen Hill [id number], MD at 7/10/2024   7:17 AM.    --- End of Report ---            LILLIAM CELAYA MD; Attending Radiologist  This document has been electronically signed. Jul 10 2024  7:21AM    < end of copied text >      Consultant(s) Notes Reviewed:  [ ] YES  [ ] NO    Care Discussed with Consultants/Other Providers [ ] YES  [ ] NO

## 2024-07-10 NOTE — PROGRESS NOTE ADULT - PROBLEM SELECTOR PLAN 2
Cr 1.43 on admission  Baseline unknown  Monitor Cr closely while on diuretics  avoid nephrotoxins.   renally dose medications BUN/Cr - 24/1.43 | BNP 5480  ECG - NSR, nonspecific TW abn, QTc 446  Start lasix 20 md IVP BID  C/w GDMT - aldactone, entresto, coreg  Repeat ECHO  Monitor Cr closely while on diuretics.   daily weight, strict ins and outs  Cardiology following

## 2024-07-11 LAB
A1C WITH ESTIMATED AVERAGE GLUCOSE RESULT: 8.3 % — HIGH (ref 4–5.6)
ALBUMIN SERPL ELPH-MCNC: 3.3 G/DL — SIGNIFICANT CHANGE UP (ref 3.3–5)
ALP SERPL-CCNC: 60 U/L — SIGNIFICANT CHANGE UP (ref 40–120)
ALT FLD-CCNC: 15 U/L — SIGNIFICANT CHANGE UP (ref 12–78)
ANION GAP SERPL CALC-SCNC: 9 MMOL/L — SIGNIFICANT CHANGE UP (ref 5–17)
AST SERPL-CCNC: 14 U/L — LOW (ref 15–37)
BILIRUB SERPL-MCNC: 0.9 MG/DL — SIGNIFICANT CHANGE UP (ref 0.2–1.2)
BUN SERPL-MCNC: 21 MG/DL — SIGNIFICANT CHANGE UP (ref 7–23)
CALCIUM SERPL-MCNC: 9.4 MG/DL — SIGNIFICANT CHANGE UP (ref 8.5–10.1)
CHLORIDE SERPL-SCNC: 105 MMOL/L — SIGNIFICANT CHANGE UP (ref 96–108)
CO2 SERPL-SCNC: 27 MMOL/L — SIGNIFICANT CHANGE UP (ref 22–31)
CREAT SERPL-MCNC: 1.07 MG/DL — SIGNIFICANT CHANGE UP (ref 0.5–1.3)
EGFR: 74 ML/MIN/1.73M2 — SIGNIFICANT CHANGE UP
ESTIMATED AVERAGE GLUCOSE: 192 MG/DL — HIGH (ref 68–114)
GLUCOSE BLDC GLUCOMTR-MCNC: 128 MG/DL — HIGH (ref 70–99)
GLUCOSE BLDC GLUCOMTR-MCNC: 154 MG/DL — HIGH (ref 70–99)
GLUCOSE BLDC GLUCOMTR-MCNC: 161 MG/DL — HIGH (ref 70–99)
GLUCOSE BLDC GLUCOMTR-MCNC: 238 MG/DL — HIGH (ref 70–99)
GLUCOSE BLDC GLUCOMTR-MCNC: 258 MG/DL — HIGH (ref 70–99)
GLUCOSE SERPL-MCNC: 129 MG/DL — HIGH (ref 70–99)
HCT VFR BLD CALC: 40 % — SIGNIFICANT CHANGE UP (ref 39–50)
HGB BLD-MCNC: 12.8 G/DL — LOW (ref 13–17)
MAGNESIUM SERPL-MCNC: 1.5 MG/DL — LOW (ref 1.6–2.6)
MCHC RBC-ENTMCNC: 29.1 PG — SIGNIFICANT CHANGE UP (ref 27–34)
MCHC RBC-ENTMCNC: 32 G/DL — SIGNIFICANT CHANGE UP (ref 32–36)
MCV RBC AUTO: 90.9 FL — SIGNIFICANT CHANGE UP (ref 80–100)
NRBC # BLD: 0 /100 WBCS — SIGNIFICANT CHANGE UP (ref 0–0)
PLATELET # BLD AUTO: 274 K/UL — SIGNIFICANT CHANGE UP (ref 150–400)
POTASSIUM SERPL-MCNC: 3.5 MMOL/L — SIGNIFICANT CHANGE UP (ref 3.5–5.3)
POTASSIUM SERPL-SCNC: 3.5 MMOL/L — SIGNIFICANT CHANGE UP (ref 3.5–5.3)
PROT SERPL-MCNC: 6.5 GM/DL — SIGNIFICANT CHANGE UP (ref 6–8.3)
RBC # BLD: 4.4 M/UL — SIGNIFICANT CHANGE UP (ref 4.2–5.8)
RBC # FLD: 15.5 % — HIGH (ref 10.3–14.5)
SODIUM SERPL-SCNC: 141 MMOL/L — SIGNIFICANT CHANGE UP (ref 135–145)
TSH SERPL-MCNC: 3.77 UIU/ML — HIGH (ref 0.36–3.74)
WBC # BLD: 6.26 K/UL — SIGNIFICANT CHANGE UP (ref 3.8–10.5)
WBC # FLD AUTO: 6.26 K/UL — SIGNIFICANT CHANGE UP (ref 3.8–10.5)

## 2024-07-11 PROCEDURE — 99232 SBSQ HOSP IP/OBS MODERATE 35: CPT

## 2024-07-11 PROCEDURE — 71250 CT THORAX DX C-: CPT | Mod: 26

## 2024-07-11 PROCEDURE — 99233 SBSQ HOSP IP/OBS HIGH 50: CPT

## 2024-07-11 RX ORDER — MAGNESIUM SULFATE 100 %
2 POWDER (GRAM) MISCELLANEOUS EVERY 4 HOURS
Refills: 0 | Status: COMPLETED | OUTPATIENT
Start: 2024-07-11 | End: 2024-07-11

## 2024-07-11 RX ORDER — POTASSIUM CHLORIDE 600 MG/1
40 TABLET, FILM COATED, EXTENDED RELEASE ORAL ONCE
Refills: 0 | Status: COMPLETED | OUTPATIENT
Start: 2024-07-11 | End: 2024-07-11

## 2024-07-11 RX ADMIN — FUROSEMIDE 60 MILLIGRAM(S): 10 INJECTION, SOLUTION INTRAMUSCULAR; INTRAVENOUS at 06:07

## 2024-07-11 RX ADMIN — CARVEDILOL PHOSPHATE 25 MILLIGRAM(S): 80 CAPSULE, EXTENDED RELEASE ORAL at 06:08

## 2024-07-11 RX ADMIN — INSULIN LISPRO 2: 100 INJECTION, SOLUTION SUBCUTANEOUS at 17:32

## 2024-07-11 RX ADMIN — Medication 5 MILLIGRAM(S): at 12:26

## 2024-07-11 RX ADMIN — Medication 25 GRAM(S): at 18:58

## 2024-07-11 RX ADMIN — HEPARIN SODIUM 5000 UNIT(S): 50 INJECTION, SOLUTION INTRAVENOUS at 18:58

## 2024-07-11 RX ADMIN — SACUBITRIL AND VALSARTAN 1 TABLET(S): 97; 103 TABLET, FILM COATED ORAL at 06:08

## 2024-07-11 RX ADMIN — ATORVASTATIN CALCIUM 80 MILLIGRAM(S): 20 TABLET, FILM COATED ORAL at 21:30

## 2024-07-11 RX ADMIN — SPIRONOLACTONE 25 MILLIGRAM(S): 25 TABLET ORAL at 06:08

## 2024-07-11 RX ADMIN — POTASSIUM CHLORIDE 40 MILLIEQUIVALENT(S): 600 TABLET, FILM COATED, EXTENDED RELEASE ORAL at 15:26

## 2024-07-11 RX ADMIN — INSULIN LISPRO 2: 100 INJECTION, SOLUTION SUBCUTANEOUS at 08:56

## 2024-07-11 RX ADMIN — ALLOPURINOL 300 MILLIGRAM(S): 300 TABLET ORAL at 12:26

## 2024-07-11 RX ADMIN — CEFEPIME 100 MILLIGRAM(S): 1 INJECTION, POWDER, FOR SOLUTION INTRAMUSCULAR; INTRAVENOUS at 21:32

## 2024-07-11 RX ADMIN — Medication 2 TABLET(S): at 21:31

## 2024-07-11 RX ADMIN — INSULIN LISPRO 4: 100 INJECTION, SOLUTION SUBCUTANEOUS at 12:52

## 2024-07-11 RX ADMIN — TAMSULOSIN HYDROCHLORIDE 0.4 MILLIGRAM(S): 0.4 CAPSULE ORAL at 21:30

## 2024-07-11 RX ADMIN — Medication 25 GRAM(S): at 16:02

## 2024-07-11 RX ADMIN — Medication 10 MILLIGRAM(S): at 12:27

## 2024-07-11 RX ADMIN — CEFEPIME 100 MILLIGRAM(S): 1 INJECTION, POWDER, FOR SOLUTION INTRAMUSCULAR; INTRAVENOUS at 06:13

## 2024-07-11 RX ADMIN — HEPARIN SODIUM 5000 UNIT(S): 50 INJECTION, SOLUTION INTRAVENOUS at 06:08

## 2024-07-11 NOTE — DIETITIAN INITIAL EVALUATION ADULT - PROBLEM SELECTOR PLAN 1
BUN/Cr - 24/1.43 | BNP 5480  ECG - NSR, nonspecific TW abn, QTc 446  Start lasix 20 md IVP BID  C/w GDMT - aldactone, entresto, coreg  Repeat ECHO  Obtain CT abdomen non con  Monitor Cr closely while on diuretics.   daily weight, strict ins and outs

## 2024-07-11 NOTE — PROGRESS NOTE ADULT - SUBJECTIVE AND OBJECTIVE BOX
Patient is a 73y old  Male who presents with a chief complaint of CHF EXACERBATION(11 Jul 2024 14:51)    INTERVAL HPI/OVERNIGHT EVENTS: No acute events overnight. HD stable.     MEDICATIONS  (STANDING):  allopurinol 300 milliGRAM(s) Oral daily  atorvastatin 80 milliGRAM(s) Oral at bedtime  bisacodyl Suppository 10 milliGRAM(s) Rectal daily  carvedilol 25 milliGRAM(s) Oral every 12 hours  cefepime   IVPB      cefepime   IVPB 1000 milliGRAM(s) IV Intermittent every 8 hours  dextrose 5%. 1000 milliLiter(s) (100 mL/Hr) IV Continuous <Continuous>  dextrose 5%. 1000 milliLiter(s) (50 mL/Hr) IV Continuous <Continuous>  dextrose 50% Injectable 12.5 Gram(s) IV Push once  dextrose 50% Injectable 25 Gram(s) IV Push once  dextrose 50% Injectable 25 Gram(s) IV Push once  finasteride 5 milliGRAM(s) Oral daily  furosemide   Injectable 60 milliGRAM(s) IV Push two times a day  glucagon  Injectable 1 milliGRAM(s) IntraMuscular once  heparin   Injectable 5000 Unit(s) SubCutaneous every 12 hours  insulin lispro (ADMELOG) corrective regimen sliding scale   SubCutaneous three times a day before meals  insulin lispro (ADMELOG) corrective regimen sliding scale   SubCutaneous at bedtime  magnesium sulfate  IVPB 2 Gram(s) IV Intermittent every 4 hours  potassium chloride    Tablet ER 40 milliEquivalent(s) Oral once  sacubitril 97 mG/valsartan 103 mG 1 Tablet(s) Oral two times a day  senna 2 Tablet(s) Oral at bedtime  spironolactone 25 milliGRAM(s) Oral daily  tamsulosin 0.4 milliGRAM(s) Oral at bedtime    MEDICATIONS  (PRN):  acetaminophen     Tablet .. 650 milliGRAM(s) Oral every 6 hours PRN Temp greater or equal to 38C (100.4F), Mild Pain (1 - 3)  aluminum hydroxide/magnesium hydroxide/simethicone Suspension 30 milliLiter(s) Oral every 4 hours PRN Dyspepsia  dextrose Oral Gel 15 Gram(s) Oral once PRN Blood Glucose LESS THAN 70 milliGRAM(s)/deciliter  melatonin 3 milliGRAM(s) Oral at bedtime PRN Insomnia  ondansetron Injectable 4 milliGRAM(s) IV Push every 8 hours PRN Nausea and/or Vomiting  polyethylene glycol 3350 17 Gram(s) Oral two times a day PRN Constipation      Allergies    No Known Allergies    Intolerances        REVIEW OF SYSTEMS: all negative with exception of above    Vital Signs Last 24 Hrs  T(C): 36.2 (11 Jul 2024 10:28), Max: 36.8 (11 Jul 2024 00:01)  T(F): 97.2 (11 Jul 2024 10:28), Max: 98.3 (11 Jul 2024 00:01)  HR: 74 (11 Jul 2024 10:28) (73 - 78)  BP: 95/62 (11 Jul 2024 10:28) (95/62 - 102/72)  BP(mean): --  RR: 18 (11 Jul 2024 10:28) (18 - 18)  SpO2: 94% (11 Jul 2024 10:28) (93% - 98%)    Parameters below as of 11 Jul 2024 10:28  Patient On (Oxygen Delivery Method): room air    PHYSICAL EXAM:  GENERAL: NAD, well-groomed  NERVOUS SYSTEM:  Alert & Oriented X3, Good concentration; Motor Strength 5/5 B/L upper and lower extremities; DTRs 2+ intact and symmetric  CHEST/LUNG: Clear to percussion bilaterally; No rales, rhonchi, wheezing, or rubs  HEART: Regular rate and rhythm; No murmurs, rubs, or gallops  ABDOMEN: Soft, Nontender, Nondistended; Bowel sounds present  EXTREMITIES:  2+ Peripheral Pulses, No clubbing, cyanosis, or edema      LABS:                        12.8   6.26  )-----------( 274      ( 11 Jul 2024 05:40 )             40.0     07-11    141  |  105  |  21  ----------------------------<  129<H>  3.5   |  27  |  1.07    Ca    9.4      11 Jul 2024 05:40  Phos  3.2     07-10  Mg     1.5     07-11    TPro  6.5  /  Alb  3.3  /  TBili  0.9  /  DBili  x   /  AST  14<L>  /  ALT  15  /  AlkPhos  60  07-11      Urinalysis Basic - ( 11 Jul 2024 05:40 )    Color: x / Appearance: x / SG: x / pH: x  Gluc: 129 mg/dL / Ketone: x  / Bili: x / Urobili: x   Blood: x / Protein: x / Nitrite: x   Leuk Esterase: x / RBC: x / WBC x   Sq Epi: x / Non Sq Epi: x / Bacteria: x      CAPILLARY BLOOD GLUCOSE      POCT Blood Glucose.: 238 mg/dL (11 Jul 2024 12:49)  POCT Blood Glucose.: 258 mg/dL (11 Jul 2024 12:25)  POCT Blood Glucose.: 161 mg/dL (11 Jul 2024 08:25)  POCT Blood Glucose.: 185 mg/dL (10 Jul 2024 22:32)  POCT Blood Glucose.: 133 mg/dL (10 Jul 2024 16:52)      RADIOLOGY & ADDITIONAL TESTS:    Imaging Personally Reviewed:  [ ] YES  [ ] NO  < from: TTE Echo Complete w/ Contrast w/ Doppler (07.10.24 @ 10:43) >  CONCLUSIONS:     1. Left ventricular cavity is moderately dilated. Left ventricular   systolic function is severely decreased with an ejection fraction   visually estimated at <20 %.   2. There is moderate (grade 2) left ventricular diastolic dysfunction.   3. There is no clear evidence of a left ventricular thrombus.   4. The left atrium is moderately dilated.   5. The right atrium is mildly dilated.   6. Normal right ventricular systolic function.   7. Moderate mitral regurgitation.   8. Trileaflet aortic valve with normal systolic excursion.   9. Trace tricuspid regurgitation.  10. Mild to moderate pulmonic regurgitation.  11. No pericardial effusion seen.    < end of copied text >        Consultant(s) Notes Reviewed:  [ ] YES  [ ] NO    Care Discussed with Consultants/Other Providers [ ] YES  [ ] NO

## 2024-07-11 NOTE — DIETITIAN INITIAL EVALUATION ADULT - PERTINENT LABORATORY DATA
07-11    141  |  105  |  21  ----------------------------<  129<H>  3.5   |  27  |  1.07    Ca    9.4      11 Jul 2024 05:40  Phos  3.2     07-10  Mg     1.5     07-11    TPro  6.5  /  Alb  3.3  /  TBili  0.9  /  DBili  x   /  AST  14<L>  /  ALT  15  /  AlkPhos  60  07-11  POCT Blood Glucose.: 238 mg/dL (07-11-24); 07-10 133, 130, 133, 185  A1C with Estimated Average Glucose Result: 8.3 %, 192 (07-11-24)  A1C Result: 8.3 % (07-10-24)

## 2024-07-11 NOTE — DIETITIAN INITIAL EVALUATION ADULT - OTHER INFO
Spoke to ot & daughter who was bedside at time of visit.  Pt lives alone; independent c ADLs; daughter is nearby.  Pt shops & cooks for himself; aware of heart healthy diet recommendations; also reviewed DM diet; discussed fluid recommendations & importance of taking diuretic medication as prescribed.  Pt reports he consumes low Na diet; takes Glipizide to control BG levels at home. Denies any N/V/C/D or chew/swallowing difficulty. Suggested daily wts to monitor for any fluid retention.

## 2024-07-11 NOTE — PROGRESS NOTE ADULT - PROBLEM SELECTOR PLAN 8
DVT ppx: HepsubQ  Fall and aspiration precautions.
DVT ppx: HepsubQ  Fall and aspiration precautions.

## 2024-07-11 NOTE — DIETITIAN INITIAL EVALUATION ADULT - PROBLEM SELECTOR PLAN 2
Cr 1.43 on admission  Baseline unknown  Monitor Cr closely while on diuretics  avoid nephrotoxins.   renally dose medications

## 2024-07-11 NOTE — CHART NOTE - NSCHARTNOTEFT_GEN_A_CORE
Called outpatient cardiologist Katy Kenny NP ([phone])  to obtain outpatient medical records, had recent TTE 3/2024.  Awaiting call back from provider or RN to inquire about any other diagnostic cardiology testing and results of TTE. Called outpatient cardiologist Katy Kenny NP ([phone])  to obtain outpatient medical records.    Patient was recently seen in June and July, was reportedly noncompliant with torsemide secondary to urinary frequency.  Patient had TTE in 3/2024 with EF 33%, mod concentric LVH, normal RV size and fx, normal size LA    Patient has had this known cardiomyopathy since at least 2016 and was being considered for ICD placement.   In terms of ischemic eval, the only thing on record per NP was a LHC in 2006 which demonstrated normal coronary arteries.    Cardiac meds: torsemide 20 mg daily, spironolactone 25 qd, entresto 97/103 bid, Jardiance 10, carvedilol 25 bid    Info obtained via Nathalie AVILA, [facility] cardiology in [place].

## 2024-07-11 NOTE — PROGRESS NOTE ADULT - SUBJECTIVE AND OBJECTIVE BOX
Patient is a 73y old  Male who presents with a chief complaint of RIVAS, edema    PAST MEDICAL & SURGICAL HISTORY:  HTN (hypertension)    HLD (hyperlipidemia)    BPH (benign prostatic hyperplasia)    Acute on chronic systolic congestive heart failure    DM (diabetes mellitus)    INTERVAL HISTORY:  	  MEDICATIONS:  MEDICATIONS  (STANDING):  allopurinol 300 milliGRAM(s) Oral daily  atorvastatin 80 milliGRAM(s) Oral at bedtime  bisacodyl Suppository 10 milliGRAM(s) Rectal daily  carvedilol 25 milliGRAM(s) Oral every 12 hours  cefepime   IVPB 1000 milliGRAM(s) IV Intermittent every 8 hours  finasteride 5 milliGRAM(s) Oral daily  furosemide   Injectable 60 milliGRAM(s) IV Push two times a day  heparin   Injectable 5000 Unit(s) SubCutaneous every 12 hours  insulin lispro (ADMELOG) corrective regimen sliding scale   SubCutaneous three times a day before meals  insulin lispro (ADMELOG) corrective regimen sliding scale   SubCutaneous at bedtime  sacubitril 97 mG/valsartan 103 mG 1 Tablet(s) Oral two times a day  senna 2 Tablet(s) Oral at bedtime  spironolactone 25 milliGRAM(s) Oral daily  tamsulosin 0.4 milliGRAM(s) Oral at bedtime    MEDICATIONS  (PRN):  acetaminophen     Tablet .. 650 milliGRAM(s) Oral every 6 hours PRN Temp greater or equal to 38C (100.4F), Mild Pain (1 - 3)  aluminum hydroxide/magnesium hydroxide/simethicone Suspension 30 milliLiter(s) Oral every 4 hours PRN Dyspepsia  dextrose Oral Gel 15 Gram(s) Oral once PRN Blood Glucose LESS THAN 70 milliGRAM(s)/deciliter  melatonin 3 milliGRAM(s) Oral at bedtime PRN Insomnia  ondansetron Injectable 4 milliGRAM(s) IV Push every 8 hours PRN Nausea and/or Vomiting  polyethylene glycol 3350 17 Gram(s) Oral two times a day PRN Constipation    Vitals:  T(F): 97.7 (07-11-24 @ 04:59), Max: 98.4 (07-10-24 @ 14:29)  HR: 78 (07-11-24 @ 04:59) (73 - 78)  BP: 100/61 (07-11-24 @ 04:59) (100/61 - 115/75)  RR: 18 (07-11-24 @ 04:59) (18 - 19)  SpO2: 93% (07-11-24 @ 04:59) (93% - 98%)    07-10 @ 07:01  -  07-11 @ 07:00  --------------------------------------------------------  IN:  Total IN: 0 mL    OUT:    Voided (mL): 1300 mL  Total OUT: 1300 mL    Total NET: -1300 mL    07-11 @ 07:01  -  07-11 @ 10:00  --------------------------------------------------------  IN:  Total IN: 0 mL    OUT:    Voided (mL): 600 mL  Total OUT: 600 mL    Total NET: -600 mL    Weight (kg): 106.1 (07-09 @ 23:34)  BMI (kg/m2): 32.6 (07-09 @ 23:34)    PHYSICAL EXAM:  Neuro: Awake, responsive  CV: S1 S2 RRR  Lungs: CTABL  GI: Soft, BS +, ND, NT  Extremities: No edema    TELEMETRY: first degree HB    RADIOLOGY:   < from: Xray Chest 2 Views PA/Lat (07.09.24 @ 16:11) >  1. Cardiomegaly, atherosclerosis but no CHF.  2. Clear lungs with no acute cardiopulmonary abnormalities.  3. Degenerative changes of the thoracic spine    < end of copied text >    DIAGNOSTIC TESTING:    [ x] Echocardiogram: < from: TTE Echo Complete w/ Contrast w/ Doppler (07.10.24 @ 10:43) >   1. Left ventricular cavity is moderately dilated. Left ventricular   systolic function is severely decreased with an ejection fraction   visually estimated at <20 %.   2. There is moderate (grade 2) left ventricular diastolic dysfunction.   3. There is no clear evidence of a left ventricular thrombus.   4. The left atrium is moderately dilated.   5. The right atrium is mildly dilated.   6. Normal right ventricular systolic function.   7. Moderate mitral regurgitation.   8. Trileaflet aortic valve with normal systolic excursion.   9. Trace tricuspid regurgitation.  10. Mild to moderate pulmonic regurgitation.  11. No pericardial effusion seen.    < end of copied text >    LABS:	 	    CARDIAC MARKERS:  Troponin I, High Sensitivity Result: 35.5 ng/L (07-09 @ 15:23)    11 Jul 2024 05:40    141    |  105    |  21     ----------------------------<  129    3.5     |  27     |  1.07   10 Jul 2024 06:58    138    |  108    |  24     ----------------------------<  156    3.7     |  23     |  1.23   09 Jul 2024 15:23    141    |  109    |  24     ----------------------------<  130    3.7     |  23     |  1.43     Ca    9.4        11 Jul 2024 05:40  Phos  3.2       10 Jul 2024 06:58  Mg     1.5       11 Jul 2024 05:40    TPro  6.5    /  Alb  3.3    /  TBili  0.9    /  DBili  x      /  AST  14     /  ALT  15     /  AlkPhos  60     11 Jul 2024 05:40                        12.8   6.26  )-----------( 274      ( 11 Jul 2024 05:40 )             40.0 ,                       13.7   7.88  )-----------( 278      ( 10 Jul 2024 06:58 )             42.4 ,                       12.9   5.78  )-----------( 286      ( 09 Jul 2024 15:23 )             38.9   pro-BNP: Pro-Brain Natriuretic Peptide (07.09.24 @ 15:23)    Pro-Brain Natriuretic Peptide: 5480 pg/mL    Lipid Profile: 07-10 @ 06:58  HDL/Total Cholesterol: --  HDL Chol:              29 mg/dL  Serum Chol:            77 mg/dL  Direct LDL:            --  Triglycerides:         87 mg/dL    TSH: Thyroid Stimulating Hormone, Serum: 3.770 uIU/mL (07-11 @ 05:40)                 Patient is a 73y old  Male who presents with a chief complaint of RIVAS, edema    PAST MEDICAL & SURGICAL HISTORY:  HTN (hypertension)    HLD (hyperlipidemia)    BPH (benign prostatic hyperplasia)    Acute on chronic systolic congestive heart failure    DM (diabetes mellitus)    INTERVAL HISTORY: in no acute distress, feeling better, LE edema improved   	  MEDICATIONS:  MEDICATIONS  (STANDING):  allopurinol 300 milliGRAM(s) Oral daily  atorvastatin 80 milliGRAM(s) Oral at bedtime  bisacodyl Suppository 10 milliGRAM(s) Rectal daily  carvedilol 25 milliGRAM(s) Oral every 12 hours  cefepime   IVPB 1000 milliGRAM(s) IV Intermittent every 8 hours  finasteride 5 milliGRAM(s) Oral daily  furosemide   Injectable 60 milliGRAM(s) IV Push two times a day  heparin   Injectable 5000 Unit(s) SubCutaneous every 12 hours  insulin lispro (ADMELOG) corrective regimen sliding scale   SubCutaneous three times a day before meals  insulin lispro (ADMELOG) corrective regimen sliding scale   SubCutaneous at bedtime  sacubitril 97 mG/valsartan 103 mG 1 Tablet(s) Oral two times a day  senna 2 Tablet(s) Oral at bedtime  spironolactone 25 milliGRAM(s) Oral daily  tamsulosin 0.4 milliGRAM(s) Oral at bedtime    MEDICATIONS  (PRN):  acetaminophen     Tablet .. 650 milliGRAM(s) Oral every 6 hours PRN Temp greater or equal to 38C (100.4F), Mild Pain (1 - 3)  aluminum hydroxide/magnesium hydroxide/simethicone Suspension 30 milliLiter(s) Oral every 4 hours PRN Dyspepsia  dextrose Oral Gel 15 Gram(s) Oral once PRN Blood Glucose LESS THAN 70 milliGRAM(s)/deciliter  melatonin 3 milliGRAM(s) Oral at bedtime PRN Insomnia  ondansetron Injectable 4 milliGRAM(s) IV Push every 8 hours PRN Nausea and/or Vomiting  polyethylene glycol 3350 17 Gram(s) Oral two times a day PRN Constipation    Vitals:  T(F): 97.7 (07-11-24 @ 04:59), Max: 98.4 (07-10-24 @ 14:29)  HR: 78 (07-11-24 @ 04:59) (73 - 78)  BP: 100/61 (07-11-24 @ 04:59) (100/61 - 115/75)  RR: 18 (07-11-24 @ 04:59) (18 - 19)  SpO2: 93% (07-11-24 @ 04:59) (93% - 98%)    07-10 @ 07:01  -  07-11 @ 07:00  --------------------------------------------------------  IN:  Total IN: 0 mL    OUT:    Voided (mL): 1300 mL  Total OUT: 1300 mL    Total NET: -1300 mL    07-11 @ 07:01  -  07-11 @ 10:00  --------------------------------------------------------  IN:  Total IN: 0 mL    OUT:    Voided (mL): 600 mL  Total OUT: 600 mL    Total NET: -600 mL    Weight (kg): 106.1 (07-09 @ 23:34)  BMI (kg/m2): 32.6 (07-09 @ 23:34)    PHYSICAL EXAM:  Neuro: Awake, responsive  CV: S1 S2 RRR + SM  Lungs: CTABL  GI: Soft, BS +, ND, NT  Extremities: Trace LE edema    TELEMETRY: first degree HB    RADIOLOGY:   < from: Xray Chest 2 Views PA/Lat (07.09.24 @ 16:11) >  1. Cardiomegaly, atherosclerosis but no CHF.  2. Clear lungs with no acute cardiopulmonary abnormalities.  3. Degenerative changes of the thoracic spine    < end of copied text >  < from: CT Abdomen and Pelvis No Cont (07.10.24 @ 06:40) >  1.  Concentric urinary bladder mural thickening and adjacent moderate   perivesicular fat stranding. Correlate with urinalysis as cystitis   suspected  2.  Marked prostate enlargement (approx 225 cc)  3.  Sclerotic L2 vertebral body (ivory vertebrae). Prostate metastasis   favored over pagetoid change.  4.  2 cm LEFT bladder base calculus  5.  Centrally thickened appendix . Correlation with normal RLQ physical   exam as early appendicitis is unlikely but not excluded.  6.  Contracted gallbladder (without stones) and with trace   pericholecystic edema, nonspecific    < end of copied text >    DIAGNOSTIC TESTING:    [ x] Echocardiogram: < from: TTE Echo Complete w/ Contrast w/ Doppler (07.10.24 @ 10:43) >   1. Left ventricular cavity is moderately dilated. Left ventricular   systolic function is severely decreased with an ejection fraction   visually estimated at <20 %.   2. There is moderate (grade 2) left ventricular diastolic dysfunction.   3. There is no clear evidence of a left ventricular thrombus.   4. The left atrium is moderately dilated.   5. The right atrium is mildly dilated.   6. Normal right ventricular systolic function.   7. Moderate mitral regurgitation.   8. Trileaflet aortic valve with normal systolic excursion.   9. Trace tricuspid regurgitation.  10. Mild to moderate pulmonic regurgitation.  11. No pericardial effusion seen.    < end of copied text >    LABS:	 	    CARDIAC MARKERS:  Troponin I, High Sensitivity Result: 35.5 ng/L (07-09 @ 15:23)    11 Jul 2024 05:40    141    |  105    |  21     ----------------------------<  129    3.5     |  27     |  1.07   10 Jul 2024 06:58    138    |  108    |  24     ----------------------------<  156    3.7     |  23     |  1.23   09 Jul 2024 15:23    141    |  109    |  24     ----------------------------<  130    3.7     |  23     |  1.43     Ca    9.4        11 Jul 2024 05:40  Phos  3.2       10 Jul 2024 06:58  Mg     1.5       11 Jul 2024 05:40    TPro  6.5    /  Alb  3.3    /  TBili  0.9    /  DBili  x      /  AST  14     /  ALT  15     /  AlkPhos  60     11 Jul 2024 05:40                        12.8   6.26  )-----------( 274      ( 11 Jul 2024 05:40 )             40.0 ,                       13.7   7.88  )-----------( 278      ( 10 Jul 2024 06:58 )             42.4 ,                       12.9   5.78  )-----------( 286      ( 09 Jul 2024 15:23 )             38.9   pro-BNP: Pro-Brain Natriuretic Peptide (07.09.24 @ 15:23)    Pro-Brain Natriuretic Peptide: 5480 pg/mL    Lipid Profile: 07-10 @ 06:58  HDL/Total Cholesterol: --  HDL Chol:              29 mg/dL  Serum Chol:            77 mg/dL  Direct LDL:            --  Triglycerides:         87 mg/dL    TSH: Thyroid Stimulating Hormone, Serum: 3.770 uIU/mL (07-11 @ 05:40)

## 2024-07-11 NOTE — DIETITIAN INITIAL EVALUATION ADULT - PERTINENT MEDS FT
Heparin, Maxipime, Admelog sliding scale, Zyloprim, Lipitor, Dulcolax, Coreg, Proscar, Lasix, Miralax, Entresto, Senna, Aldactone, Flomax, Maalox, Melatonin, Zofran

## 2024-07-11 NOTE — PROGRESS NOTE ADULT - PROBLEM SELECTOR PLAN 2
BUN/Cr - 24/1.43 | BNP 5480  ECG - NSR, nonspecific TW abn, QTc 446  - C/w IV lasix 60 mg BID  C/w GDMT - aldactone, entresto, coreg  Repeat ECHO reviewed  Monitor Cr closely while on diuretics.   daily weight, strict ins and outs  Cardiology following- will obtain medical records from o/p cardiologist, Katy Kenny NP ([phone])

## 2024-07-11 NOTE — PROGRESS NOTE ADULT - NS ATTEND AMEND GEN_ALL_CORE FT
73M with CHF, HTN, HLD, DM p/w sob and edema.    ADHF  -pt on multiple cardiac meds suggesting hx of CHF, unclear EF  -TTE showing LV EF <20  -cont aldactone entresto and coreg per home doses  -cont iv diuresis here  -does not appear grossly overloaded on exam, however suspect volume status limited by body habitus  replete lytes as required  -monitor on tele  -pro-bnp and lactate noted  -d/w primary team, please obtain outpt cardiac records from pts cardiologist at [facility], will consider further testing pending review of those tests  -will follow with you

## 2024-07-11 NOTE — PROGRESS NOTE ADULT - PROBLEM SELECTOR PLAN 7
c/w home meds  bladder scan    Reviewed CT A/P findings concerning for metastatic prostate cancer. Findings discussed with patient on 7/10  - CT chest pending  - Heme/onc c/s placed- Scheduled for tomorrow 7/12 at 1 pm

## 2024-07-12 ENCOUNTER — TRANSCRIPTION ENCOUNTER (OUTPATIENT)
Age: 74
End: 2024-07-12

## 2024-07-12 ENCOUNTER — APPOINTMENT (OUTPATIENT)
Dept: HEMATOLOGY ONCOLOGY | Facility: CLINIC | Age: 74
End: 2024-07-12

## 2024-07-12 VITALS
TEMPERATURE: 98 F | SYSTOLIC BLOOD PRESSURE: 111 MMHG | HEART RATE: 92 BPM | OXYGEN SATURATION: 97 % | RESPIRATION RATE: 18 BRPM | DIASTOLIC BLOOD PRESSURE: 74 MMHG

## 2024-07-12 LAB
ALBUMIN SERPL ELPH-MCNC: 3.2 G/DL — LOW (ref 3.3–5)
ALP SERPL-CCNC: 67 U/L — SIGNIFICANT CHANGE UP (ref 40–120)
ALT FLD-CCNC: 14 U/L — SIGNIFICANT CHANGE UP (ref 12–78)
ANION GAP SERPL CALC-SCNC: 10 MMOL/L — SIGNIFICANT CHANGE UP (ref 5–17)
AST SERPL-CCNC: 13 U/L — LOW (ref 15–37)
BILIRUB SERPL-MCNC: 0.8 MG/DL — SIGNIFICANT CHANGE UP (ref 0.2–1.2)
BUN SERPL-MCNC: 21 MG/DL — SIGNIFICANT CHANGE UP (ref 7–23)
CALCIUM SERPL-MCNC: 9.3 MG/DL — SIGNIFICANT CHANGE UP (ref 8.5–10.1)
CHLORIDE SERPL-SCNC: 103 MMOL/L — SIGNIFICANT CHANGE UP (ref 96–108)
CO2 SERPL-SCNC: 29 MMOL/L — SIGNIFICANT CHANGE UP (ref 22–31)
CREAT SERPL-MCNC: 1.18 MG/DL — SIGNIFICANT CHANGE UP (ref 0.5–1.3)
EGFR: 66 ML/MIN/1.73M2 — SIGNIFICANT CHANGE UP
GLUCOSE BLDC GLUCOMTR-MCNC: 157 MG/DL — HIGH (ref 70–99)
GLUCOSE BLDC GLUCOMTR-MCNC: 265 MG/DL — HIGH (ref 70–99)
GLUCOSE SERPL-MCNC: 177 MG/DL — HIGH (ref 70–99)
HCT VFR BLD CALC: 41.9 % — SIGNIFICANT CHANGE UP (ref 39–50)
HGB BLD-MCNC: 13.5 G/DL — SIGNIFICANT CHANGE UP (ref 13–17)
MAGNESIUM SERPL-MCNC: 1.9 MG/DL — SIGNIFICANT CHANGE UP (ref 1.6–2.6)
MCHC RBC-ENTMCNC: 29.2 PG — SIGNIFICANT CHANGE UP (ref 27–34)
MCHC RBC-ENTMCNC: 32.2 G/DL — SIGNIFICANT CHANGE UP (ref 32–36)
MCV RBC AUTO: 90.7 FL — SIGNIFICANT CHANGE UP (ref 80–100)
NRBC # BLD: 0 /100 WBCS — SIGNIFICANT CHANGE UP (ref 0–0)
PLATELET # BLD AUTO: 292 K/UL — SIGNIFICANT CHANGE UP (ref 150–400)
POTASSIUM SERPL-MCNC: 3.2 MMOL/L — LOW (ref 3.5–5.3)
POTASSIUM SERPL-SCNC: 3.2 MMOL/L — LOW (ref 3.5–5.3)
PROT SERPL-MCNC: 6.5 GM/DL — SIGNIFICANT CHANGE UP (ref 6–8.3)
RBC # BLD: 4.62 M/UL — SIGNIFICANT CHANGE UP (ref 4.2–5.8)
RBC # FLD: 15.6 % — HIGH (ref 10.3–14.5)
SODIUM SERPL-SCNC: 142 MMOL/L — SIGNIFICANT CHANGE UP (ref 135–145)
WBC # BLD: 6.12 K/UL — SIGNIFICANT CHANGE UP (ref 3.8–10.5)
WBC # FLD AUTO: 6.12 K/UL — SIGNIFICANT CHANGE UP (ref 3.8–10.5)

## 2024-07-12 PROCEDURE — G0406: CPT | Mod: 93

## 2024-07-12 PROCEDURE — G0425: CPT | Mod: 93

## 2024-07-12 PROCEDURE — 99239 HOSP IP/OBS DSCHRG MGMT >30: CPT

## 2024-07-12 RX ORDER — ALLOPURINOL 300 MG/1
1 TABLET ORAL
Refills: 0 | DISCHARGE

## 2024-07-12 RX ORDER — ALLOPURINOL 300 MG/1
1 TABLET ORAL
Qty: 0 | Refills: 0 | DISCHARGE
Start: 2024-07-12

## 2024-07-12 RX ORDER — SPIRONOLACTONE 25 MG/1
1 TABLET ORAL
Qty: 0 | Refills: 0 | DISCHARGE
Start: 2024-07-12

## 2024-07-12 RX ORDER — SPIRONOLACTONE 25 MG/1
1 TABLET ORAL
Refills: 0 | DISCHARGE

## 2024-07-12 RX ORDER — POTASSIUM CHLORIDE 600 MG/1
40 TABLET, FILM COATED, EXTENDED RELEASE ORAL ONCE
Refills: 0 | Status: COMPLETED | OUTPATIENT
Start: 2024-07-12 | End: 2024-07-12

## 2024-07-12 RX ORDER — SACUBITRIL AND VALSARTAN 97; 103 MG/1; MG/1
1 TABLET, FILM COATED ORAL
Qty: 0 | Refills: 0 | DISCHARGE
Start: 2024-07-12

## 2024-07-12 RX ORDER — CARVEDILOL PHOSPHATE 80 MG/1
1 CAPSULE, EXTENDED RELEASE ORAL
Qty: 0 | Refills: 0 | DISCHARGE
Start: 2024-07-12

## 2024-07-12 RX ORDER — CARVEDILOL PHOSPHATE 80 MG/1
1 CAPSULE, EXTENDED RELEASE ORAL
Refills: 0 | DISCHARGE

## 2024-07-12 RX ORDER — TAMSULOSIN HYDROCHLORIDE 0.4 MG/1
1 CAPSULE ORAL
Refills: 0 | DISCHARGE

## 2024-07-12 RX ORDER — ATORVASTATIN CALCIUM 20 MG/1
1 TABLET, FILM COATED ORAL
Qty: 0 | Refills: 0 | DISCHARGE
Start: 2024-07-12

## 2024-07-12 RX ORDER — ATORVASTATIN CALCIUM 20 MG/1
1 TABLET, FILM COATED ORAL
Refills: 0 | DISCHARGE

## 2024-07-12 RX ORDER — SACUBITRIL AND VALSARTAN 97; 103 MG/1; MG/1
1 TABLET, FILM COATED ORAL
Refills: 0 | DISCHARGE

## 2024-07-12 RX ORDER — TAMSULOSIN HYDROCHLORIDE 0.4 MG/1
1 CAPSULE ORAL
Qty: 0 | Refills: 0 | DISCHARGE
Start: 2024-07-12

## 2024-07-12 RX ORDER — AMOXICILLIN/POTASSIUM CLAV 250-125 MG
1 TABLET ORAL
Qty: 7 | Refills: 0
Start: 2024-07-12 | End: 2024-07-15

## 2024-07-12 RX ADMIN — CEFEPIME 100 MILLIGRAM(S): 1 INJECTION, POWDER, FOR SOLUTION INTRAMUSCULAR; INTRAVENOUS at 13:01

## 2024-07-12 RX ADMIN — SACUBITRIL AND VALSARTAN 1 TABLET(S): 97; 103 TABLET, FILM COATED ORAL at 06:22

## 2024-07-12 RX ADMIN — INSULIN LISPRO 2: 100 INJECTION, SOLUTION SUBCUTANEOUS at 09:00

## 2024-07-12 RX ADMIN — INSULIN LISPRO 6: 100 INJECTION, SOLUTION SUBCUTANEOUS at 11:53

## 2024-07-12 RX ADMIN — ALLOPURINOL 300 MILLIGRAM(S): 300 TABLET ORAL at 11:52

## 2024-07-12 RX ADMIN — POTASSIUM CHLORIDE 40 MILLIEQUIVALENT(S): 600 TABLET, FILM COATED, EXTENDED RELEASE ORAL at 13:01

## 2024-07-12 RX ADMIN — CEFEPIME 100 MILLIGRAM(S): 1 INJECTION, POWDER, FOR SOLUTION INTRAMUSCULAR; INTRAVENOUS at 06:22

## 2024-07-12 RX ADMIN — HEPARIN SODIUM 5000 UNIT(S): 50 INJECTION, SOLUTION INTRAVENOUS at 06:22

## 2024-07-12 RX ADMIN — FUROSEMIDE 60 MILLIGRAM(S): 10 INJECTION, SOLUTION INTRAMUSCULAR; INTRAVENOUS at 11:52

## 2024-07-12 RX ADMIN — Medication 5 MILLIGRAM(S): at 13:01

## 2024-07-12 NOTE — DISCHARGE NOTE PROVIDER - ATTENDING DISCHARGE PHYSICAL EXAMINATION:
Vital Signs Last 24 Hrs  T(F): 97.9 (12 Jul 2024 10:19), Max: 97.9 (12 Jul 2024 10:19)  HR: 86 (12 Jul 2024 10:19) (72 - 86)  BP: 105/70 (12 Jul 2024 10:19) (92/60 - 107/75)  RR: 19 (12 Jul 2024 10:19) (18 - 19)  SpO2: 96% (12 Jul 2024 10:19) (95% - 97%)    Physical Exam:  Constitutional: NAD, awake and alert  Respiratory: cta b/l no wheezing no rhonchi  Cardiovascular: +s1/s2 no edema b/l le  Gastrointestinal: soft nt nd bs+  Vascular: 2+ peripheral pulses  Neurological: A/O x 3, no focal deficits

## 2024-07-12 NOTE — CONSULT NOTE ADULT - SUBJECTIVE AND OBJECTIVE BOX
DANIEL ENRIQUE  [id number]    Date of Consult:  7/10/23  CHIEF COMPLAINT:  SOB  HISTORY OF PRESENT ILLNESS:  73M with CHF, HTN, HLD, DM p/w sob and edema. pt states over the past 2 weeks he has noticed sob, abd distension and LE edema. pt on mulitple cardiac meds, states full compliance except for torsemide as he does not like having to run to urinate so often. Pt states he was recently hospitalized at [facility] and sent home on torsemide but didnt take it. pt without cp syncope, palpitations.   currently pt is comfortable appearing, no resp distress, not on oxygen support.       Allergies    No Known Allergies    Intolerances    	    MEDICATIONS:  carvedilol 25 milliGRAM(s) Oral every 12 hours  furosemide   Injectable 20 milliGRAM(s) IV Push every 12 hours  heparin   Injectable 5000 Unit(s) SubCutaneous every 12 hours  sacubitril 97 mG/valsartan 103 mG 1 Tablet(s) Oral two times a day  spironolactone 25 milliGRAM(s) Oral daily    cefepime   IVPB 1000 milliGRAM(s) IV Intermittent every 8 hours  cefepime   IVPB          acetaminophen     Tablet .. 650 milliGRAM(s) Oral every 6 hours PRN  melatonin 3 milliGRAM(s) Oral at bedtime PRN  ondansetron Injectable 4 milliGRAM(s) IV Push every 8 hours PRN    aluminum hydroxide/magnesium hydroxide/simethicone Suspension 30 milliLiter(s) Oral every 4 hours PRN  bisacodyl Suppository 10 milliGRAM(s) Rectal daily  polyethylene glycol 3350 17 Gram(s) Oral two times a day PRN  senna 2 Tablet(s) Oral at bedtime    allopurinol 300 milliGRAM(s) Oral daily  atorvastatin 80 milliGRAM(s) Oral at bedtime  dextrose 50% Injectable 12.5 Gram(s) IV Push once  dextrose 50% Injectable 25 Gram(s) IV Push once  dextrose 50% Injectable 25 Gram(s) IV Push once  dextrose Oral Gel 15 Gram(s) Oral once PRN  finasteride 5 milliGRAM(s) Oral daily  glucagon  Injectable 1 milliGRAM(s) IntraMuscular once  insulin lispro (ADMELOG) corrective regimen sliding scale   SubCutaneous three times a day before meals  insulin lispro (ADMELOG) corrective regimen sliding scale   SubCutaneous at bedtime    dextrose 5%. 1000 milliLiter(s) IV Continuous <Continuous>  dextrose 5%. 1000 milliLiter(s) IV Continuous <Continuous>  tamsulosin 0.4 milliGRAM(s) Oral at bedtime      PAST MEDICAL & SURGICAL HISTORY:  HTN (hypertension)      HLD (hyperlipidemia)      BPH (benign prostatic hyperplasia)      Acute on chronic systolic congestive heart failure      DM (diabetes mellitus)          FAMILY HISTORY:  none    SOCIAL HISTORY:    no tob etoh      REVIEW OF SYSTEMS:  See HPI. Otherwise, 10 point ROS done and otherwise negative.    PHYSICAL EXAM:  T(C): 36.3 (07-10-24 @ 10:42), Max: 36.9 (07-09-24 @ 23:34)  HR: 74 (07-10-24 @ 10:42) (72 - 91)  BP: 115/75 (07-10-24 @ 10:42) (108/74 - 132/84)  RR: 19 (07-10-24 @ 10:42) (18 - 20)  SpO2: 97% (07-10-24 @ 10:42) (96% - 98%)  Wt(kg): --  I&O's Summary    09 Jul 2024 07:01  -  10 Jul 2024 07:00  --------------------------------------------------------  IN: 0 mL / OUT: 400 mL / NET: -400 mL        Appearance: Normal	  HEENT:   Normal oral mucosa, PERRL, EOMI	  Lymphatic: No lymphadenopathy  Cardiovascular: Normal S1 S2, No JVD, No murmurs, 1+ pitting b/l LE edema  Respiratory: bibasilar crackles  Psychiatry: A & O x 3, Mood & affect appropriate  Gastrointestinal:  Soft, Non-tender, + BS	  Skin: No rashes, No ecchymoses, No cyanosis	  Neurologic: Non-focal  Extremities: Normal range of motion, No clubbing, cyanosis       LABS:	 	    CBC Full  -  ( 10 Jul 2024 06:58 )  WBC Count : 7.88 K/uL  Hemoglobin : 13.7 g/dL  Hematocrit : 42.4 %  Platelet Count - Automated : 278 K/uL  Mean Cell Volume : 91.6 fl  Mean Cell Hemoglobin : 29.6 pg  Mean Cell Hemoglobin Concentration : 32.3 g/dL  Auto Neutrophil # : 5.86 K/uL  Auto Lymphocyte # : 1.11 K/uL  Auto Monocyte # : 0.56 K/uL  Auto Eosinophil # : 0.28 K/uL  Auto Basophil # : 0.04 K/uL  Auto Neutrophil % : 74.3 %  Auto Lymphocyte % : 14.1 %  Auto Monocyte % : 7.1 %  Auto Eosinophil % : 3.6 %  Auto Basophil % : 0.5 %    07-10    138  |  108  |  24<H>  ----------------------------<  156<H>  3.7   |  23  |  1.23  07-09    141  |  109<H>  |  24<H>  ----------------------------<  130<H>  3.7   |  23  |  1.43<H>    Ca    9.7      10 Jul 2024 06:58  Ca    9.1      09 Jul 2024 15:23  Phos  3.2     07-10  Mg     1.0     07-10    TPro  7.0  /  Alb  3.8  /  TBili  1.0  /  DBili  x   /  AST  17  /  ALT  16  /  AlkPhos  63  07-10  TPro  6.7  /  Alb  3.5  /  TBili  0.9  /  DBili  x   /  AST  14<L>  /  ALT  14  /  AlkPhos  49  07-09      proBNP:   Lipid Profile:   HgA1c:   TSH:       CARDIAC MARKERS:            TELEMETRY: 	    ECG:  	  RADIOLOGY:  OTHER: 	    PREVIOUS DIAGNOSTIC TESTING:    [ ] Echocardiogram:  [ ]  Catheterization:  [ ] Stress Test:  	  	  ASSESSMENT/PLAN: 	    Joe Cochran MD    
Mihir Tyler is a 73 year old male with medical history significant for HTN, HLD, DM, CHF, and BPH who is admitted with worsening dyspnea and edema consistent with CHF exacerbation. During evaluation, he underwent CT Abd/Pel without contrast which demonstrated an enlarged prostate and a lesion in L2 concerning for possible metastatic disease. PSA has not been performed. Oncology is consulted for assistance with diagnostic management.     Limited history is available as I was only able to speak to the patient via telephone for a short period before he had to use the bathroom and I was subsequently unable to reach him.     ROS unable to be completed.     Please refer to H&P for relevant medical, surgical, family, and social history  Of note, per his medication list, he is on tamsulosin and finasteride for BPH    Physical exam unable to be completed    ACC: [id number] EXAM:  CT ABDOMEN AND PELVIS   ORDERED BY: Stephen Hill     PROCEDURE DATE:  07/10/2024          INTERPRETATION:  CLINICAL INFORMATION: Abdominal pain    COMPARISON: None.    CONTRAST/COMPLICATIONS:  IV Contrast: None.  Oral Contrast: None.  Complications: None.    PROCEDURE:  CT of the Abdomen and Pelvis was performed.  Sagittal and coronal reformats were performed.    LIMITATIONS: Lack of intravenous contrast material limits diagnostic   sensitivity in evaluating solid organs /vasculature.    FINDINGS:  LOWER CHEST: Cardiomegaly. Coronary artery calcification.    LIVER: Within normal limits.  BILE DUCTS: Normal caliber.  GALLBLADDER: Contracted gallbladder without calcified gallstones. Trace   pericholecystic edema  SPLEEN: Within normal limits.  PANCREAS: Within normal limits.  ADRENALS: Within normal limits.  KIDNEYS/URETERS: Bilateral renal cysts measure up to 7 cm in the mid LEFT   kidney. 2.4 cm RIGHT lower pole cystic lesion with peripheral   hyperintensity or calcification , incompletely characterized on   noncontrast CT. 5 mm RIGHT lower pole intrarenal calculus. No obstructive   uropathy    BLADDER: Concentric urinary bladder mural thickening and adjacent   moderate perivesicular fat stranding. 2 cm LEFT bladder base calculus.  REPRODUCTIVE ORGANS: Marked prostate enlargement (approx 225 cc) indents   bladder base.    BOWEL: No bowel obstruction. Thickened central appendix (1.1 cm max   diameter) with subsequent  gas filling. No diverticulitis  PERITONEUM/RETROPERITONEUM: No ascites or pneumoperitoneum.  VESSELS: Atherosclerotic changes.  LYMPH NODES: No lymphadenopathy.  ABDOMINAL WALL: Fat-containing umbilical hernia. RIGHT inguinal   herniorrhaphy. Fat-containing LEFT inguinal hernia with /without   associated inguinal lipoma. LEFT paramedian ventral wall injection   change.. Mild subcutaneous pelvic edema  BONES: Sclerotic L2 vertebral body (ivory vertebrae). Mild lumbar   degenerative changes    IMPRESSION:  1.  Concentric urinary bladder mural thickening and adjacent moderate   perivesicular fat stranding. Correlate with urinalysis as cystitis   suspected  2.  Marked prostate enlargement (approx 225 cc)  3.  Sclerotic L2 vertebral body (ivory vertebrae). Prostate metastasis   favored over pagetoid change.  4.  2 cm LEFT bladder base calculus  5.  Centrally thickened appendix . Correlation with normal RLQ physical   exam as early appendicitis is unlikely but not excluded.  6.  Contracted gallbladder (without stones) and with trace   pericholecystic edema, nonspecific

## 2024-07-12 NOTE — DISCHARGE NOTE PROVIDER - NSDCMRMEDTOKEN_GEN_ALL_CORE_FT
allopurinol 300 mg oral tablet: 1 tab(s) orally once a day  amoxicillin-clavulanate 875 mg-125 mg oral tablet: 1 tab(s) orally 2 times a day  atorvastatin 80 mg oral tablet: 1 tab(s) orally once a day (at bedtime)  carvedilol 25 mg oral tablet: 1 tab(s) orally every 12 hours  empagliflozin 10 mg oral tablet: 1 tab(s) orally once a day  finasteride 5 mg oral tablet: 1 tab(s) orally once a day  glipiZIDE 10 mg oral tablet, extended release: 1 tab(s) orally once a day  metFORMIN 1000 mg oral tablet: 1 tab(s) orally 2 times a day  sacubitril-valsartan 97 mg-103 mg oral tablet: 1 tab(s) orally 2 times a day  spironolactone 25 mg oral tablet: 1 tab(s) orally once a day  tamsulosin 0.4 mg oral capsule: 1 cap(s) orally once a day (at bedtime)  torsemide 20 mg oral tablet: 1 tab(s) orally once a day  traMADol 50 mg oral tablet: 1 tab(s) orally every 6 hours as needed for  severe pain

## 2024-07-12 NOTE — PROGRESS NOTE ADULT - ASSESSMENT
73M with CHF, HTN, HLD, DM p/w sob and edema.  BNP 5400  pt admits that he had stopped taking torsemide at home    ADHF  -TTE with EF < 20%, Grade II DD, mod MR, mild-mod DE  -cont aldactone entresto and coreg per home doses  -iv diuresis can be switched to oral as pt is nearly euvolemic   -replete lytes as required  -CHF teaching provided, nutrition eval, salt and fluid restriction, reinforced importance of medication adherence     -CT A/P with Marked prostate enlargement, CT chest: Sclerotic L2 vertebra concerning for metastatic disease.  -outpatient oncology eval  -pt follows up with cardiology at [facility], follow up with cardiology within 5-7 days of discharge 
73M admitted for acute on chronic CHF. 
73M with CHF, HTN, HLD, DM p/w sob and edema.  BNP 5400    ADHF  -TTE with EF < 20%, Grade II DD, mod MR, mild-mod [place]  -cont aldactone entresto and coreg per home doses  -cont iv diuresis   -RIVAS and LE edema much improved  -replete lytes as required  -monitor on tele  -CT A/P with Marked prostate enlargement, CT chest pending   -will reach out to pt's cardiologist at [facility] for further info  -will follow with you 
73M admitted for acute on chronic CHF.

## 2024-07-12 NOTE — DISCHARGE NOTE PROVIDER - HOSPITAL COURSE
73M with CHF, HTN, HLD, DM p/w sob and edema. Patient admitted for acute on chronic decompensated heart failure. TTE with EF < 20%, Grade II DD, mod MR, mild-mod DE. Patient seen by cardiology and started on IV diuresis with IV lasix 60 mg bid. Patient found to have  marked prostate enlargement with sclerotic L2 vertebral body concerning for metastatic prostate cancer. Findings were discussed with patient and oncology evaluation pending. Patient opted to follow up with oncology outpatient. Patient will be discharged home.     TTE with EF < 20%, Grade II DD, mod MR, mild-mod DE    < from: CT Chest No Cont (07.11.24 @ 11:22) >    IMPRESSION:  Mild bilateral bronchiectasis and lingular discoid atelectasis.    Cardiomegaly.    Sclerotic L2 vertebra concerning for metastatic disease. Paget's disease   is not excluded.        --- End of Report ---            MARY HANCOCK MD; Attending Radiologist  This document has been electronically signed. Jul 11 2024  5:07PM    < end of copied text >

## 2024-07-12 NOTE — DISCHARGE NOTE NURSING/CASE MANAGEMENT/SOCIAL WORK - PATIENT PORTAL LINK FT
You can access the FollowMyHealth Patient Portal offered by [facility] by registering at the following website: [email or web address]. By joining [facility]’s FollowMyHealth portal, you will also be able to view your health information using other applications (apps) compatible with our system.

## 2024-07-12 NOTE — PROGRESS NOTE ADULT - NS ATTEND AMEND GEN_ALL_CORE FT
patient seen and examined  labs and vitals reviewed  agree with above assessment and plan  pt approaching euvolemic state  no complaints, denies cp sob edema orthopnea  pt would like to discuss further cardiac testing with his primary cardiologist at [facility], does not wish to pursue further testing until he meets with his cardiologist in person, which is reasonable  d/w pt RE" echo findings, EF.   pt told he should follow up within one week with his cardio, pt agreeable.   cont care per primary team patient seen and examined  labs and vitals reviewed  agree with above assessment and plan  pt approaching euvolemic state, agree with changing diuretics to po   no complaints, denies cp sob edema orthopnea  pt would like to discuss further cardiac testing with his primary cardiologist at [facility], does not wish to pursue further testing until he meets with his cardiologist in person, which is reasonable  d/w pt RE" echo findings, EF.   pt told he should follow up within one week with his cardio, pt agreeable.   cont care per primary team

## 2024-07-12 NOTE — CONSULT NOTE ADULT - ASSESSMENT
73M with CHF, HTN, HLD, DM p/w sob and edema.    ADHF  -pt on multiple cardiac meds suggesting hx of CHF, unclear EF  -TTE pending  -cont aldactone entresto and coreg per home doses  -cont iv diuresis here  -does not appear grossly overloaded on exam  replete lytes as required  -monitor on tele  -pro-bnp and lactate noted  -further recs to follow results of tte  -will follow with you 
Mihir Tyler is a 73 year old male with medical history significant for HTN, HLD, DM, CHF, and BPH who is incidentally found to have enlarged prostate and a lesion in L2 concerning for possible metastatic prostate cancer.     To expedite outpatient workup, please order PSA and testosterone level. Biopsy of L2 lesion will be needed to determine etiology. If able to be done while admitted, that would be helpful, otherwise this can be scheduled as an outpatient. If biopsy is consistent with prostate cancer, PSMA PET/CT will be ordered as an outpatient to complete staging.     Prior to discharge, please call [facility] at [phone] to facilitate outpatient appointment with me.

## 2024-07-12 NOTE — PROGRESS NOTE ADULT - SUBJECTIVE AND OBJECTIVE BOX
Patient is a 73y old  Male who presents with a chief complaint of RIVAS and edema     PAST MEDICAL & SURGICAL HISTORY:  HTN (hypertension)    HLD (hyperlipidemia)    BPH (benign prostatic hyperplasia)    Acute on chronic systolic congestive heart failure    DM (diabetes mellitus)    INTERVAL HISTORY:  	  MEDICATIONS:  MEDICATIONS  (STANDING):  allopurinol 300 milliGRAM(s) Oral daily  atorvastatin 80 milliGRAM(s) Oral at bedtime  bisacodyl Suppository 10 milliGRAM(s) Rectal daily  carvedilol 25 milliGRAM(s) Oral every 12 hours  cefepime   IVPB 1000 milliGRAM(s) IV Intermittent every 8 hours  finasteride 5 milliGRAM(s) Oral daily  furosemide   Injectable 60 milliGRAM(s) IV Push two times a day  heparin   Injectable 5000 Unit(s) SubCutaneous every 12 hours  insulin lispro (ADMELOG) corrective regimen sliding scale   SubCutaneous three times a day before meals  insulin lispro (ADMELOG) corrective regimen sliding scale   SubCutaneous at bedtime  sacubitril 97 mG/valsartan 103 mG 1 Tablet(s) Oral two times a day  senna 2 Tablet(s) Oral at bedtime  spironolactone 25 milliGRAM(s) Oral daily  tamsulosin 0.4 milliGRAM(s) Oral at bedtime    MEDICATIONS  (PRN):  acetaminophen     Tablet .. 650 milliGRAM(s) Oral every 6 hours PRN Temp greater or equal to 38C (100.4F), Mild Pain (1 - 3)  aluminum hydroxide/magnesium hydroxide/simethicone Suspension 30 milliLiter(s) Oral every 4 hours PRN Dyspepsia  dextrose Oral Gel 15 Gram(s) Oral once PRN Blood Glucose LESS THAN 70 milliGRAM(s)/deciliter  melatonin 3 milliGRAM(s) Oral at bedtime PRN Insomnia  ondansetron Injectable 4 milliGRAM(s) IV Push every 8 hours PRN Nausea and/or Vomiting  polyethylene glycol 3350 17 Gram(s) Oral two times a day PRN Constipation    Vitals:  T(F): 97.5 (07-12-24 @ 05:10), Max: 97.6 (07-11-24 @ 15:57)  HR: 75 (07-12-24 @ 06:12) (72 - 79)  BP: 107/75 (07-12-24 @ 06:12) (92/60 - 107/75)  RR: 18 (07-12-24 @ 05:10) (18 - 18)  SpO2: 95% (07-12-24 @ 05:10) (94% - 97%)    07-11 @ 07:01  -  07-12 @ 07:00  --------------------------------------------------------  IN:    IV PiggyBack: 100 mL  Total IN: 100 mL    OUT:    Stool (mL): 1 mL    Voided (mL): 600 mL  Total OUT: 601 mL    Total NET: -501 mL    Weight (kg): 106.1 (07-09 @ 23:34)  BMI (kg/m2): 32.6 (07-09 @ 23:34)    PHYSICAL EXAM:  Neuro: Awake, responsive  CV: S1 S2 RRR  Lungs: CTABL  GI: Soft, BS +, ND, NT  Extremities: No edema    TELEMETRY: first degree HB    RADIOLOGY:   < from: CT Chest No Cont (07.11.24 @ 11:22) >  Mild bilateral bronchiectasis and lingular discoid atelectasis.    Cardiomegaly.    Sclerotic L2 vertebra concerning for metastatic disease. Paget's disease   is not excluded.    < end of copied text >    DIAGNOSTIC TESTING:    [x ] Echocardiogram: < from: TTE Echo Complete w/ Contrast w/ Doppler (07.10.24 @ 10:43) >   1. Left ventricular cavity is moderately dilated. Left ventricular   systolic function is severely decreased with an ejection fraction   visually estimated at <20 %.   2. There is moderate (grade 2) left ventricular diastolic dysfunction.   3. There is no clear evidence of a left ventricular thrombus.   4. The left atrium is moderately dilated.   5. The right atrium is mildly dilated.   6. Normal right ventricular systolic function.   7. Moderate mitral regurgitation.   8. Trileaflet aortic valve with normal systolic excursion.   9. Trace tricuspid regurgitation.  10. Mild to moderate pulmonic regurgitation.  11. No pericardial effusion seen.    < end of copied text >    LABS:	 	    CARDIAC MARKERS:  Troponin I, High Sensitivity Result: 35.5 ng/L (07-09 @ 15:23)    12 Jul 2024 05:51    142    |  103    |  21     ----------------------------<  177    3.2     |  29     |  1.18   11 Jul 2024 05:40    141    |  105    |  21     ----------------------------<  129    3.5     |  27     |  1.07   10 Jul 2024 06:58    138    |  108    |  24     ----------------------------<  156    3.7     |  23     |  1.23     Ca    9.3        12 Jul 2024 05:51  Mg     1.9       12 Jul 2024 05:51    TPro  6.5    /  Alb  3.2    /  TBili  0.8    /  DBili  x      /  AST  13     /  ALT  14     /  AlkPhos  67     12 Jul 2024 05:51                        13.5   6.12  )-----------( 292      ( 12 Jul 2024 05:51 )             41.9 ,                       12.8   6.26  )-----------( 274      ( 11 Jul 2024 05:40 )             40.0 ,                       13.7   7.88  )-----------( 278      ( 10 Jul 2024 06:58 )             42.4 ,                       12.9   5.78  )-----------( 286      ( 09 Jul 2024 15:23 )             38.9     pro-BNP: Pro-Brain Natriuretic Peptide (07.09.24 @ 15:23)    Pro-Brain Natriuretic Peptide: 5480 pg/mL    Lipid Profile: 07-10 @ 06:58  HDL/Total Cholesterol: --  HDL Chol:              29 mg/dL  Serum Chol:            77 mg/dL  Direct LDL:            --  Triglycerides:         87 mg/dL    TSH: Thyroid Stimulating Hormone, Serum: 3.770 uIU/mL (07-11 @ 05:40)                 Patient is a 73y old  Male who presents with a chief complaint of RIVAS and edema     PAST MEDICAL & SURGICAL HISTORY:  HTN (hypertension)    HLD (hyperlipidemia)    BPH (benign prostatic hyperplasia)    Acute on chronic systolic congestive heart failure    DM (diabetes mellitus)    INTERVAL HISTORY: in no distress, denies any chest pain or sob, nearly euvolemic   	  MEDICATIONS:  MEDICATIONS  (STANDING):  allopurinol 300 milliGRAM(s) Oral daily  atorvastatin 80 milliGRAM(s) Oral at bedtime  bisacodyl Suppository 10 milliGRAM(s) Rectal daily  carvedilol 25 milliGRAM(s) Oral every 12 hours  cefepime   IVPB 1000 milliGRAM(s) IV Intermittent every 8 hours  finasteride 5 milliGRAM(s) Oral daily  furosemide   Injectable 60 milliGRAM(s) IV Push two times a day  heparin   Injectable 5000 Unit(s) SubCutaneous every 12 hours  insulin lispro (ADMELOG) corrective regimen sliding scale   SubCutaneous three times a day before meals  insulin lispro (ADMELOG) corrective regimen sliding scale   SubCutaneous at bedtime  sacubitril 97 mG/valsartan 103 mG 1 Tablet(s) Oral two times a day  senna 2 Tablet(s) Oral at bedtime  spironolactone 25 milliGRAM(s) Oral daily  tamsulosin 0.4 milliGRAM(s) Oral at bedtime    MEDICATIONS  (PRN):  acetaminophen     Tablet .. 650 milliGRAM(s) Oral every 6 hours PRN Temp greater or equal to 38C (100.4F), Mild Pain (1 - 3)  aluminum hydroxide/magnesium hydroxide/simethicone Suspension 30 milliLiter(s) Oral every 4 hours PRN Dyspepsia  dextrose Oral Gel 15 Gram(s) Oral once PRN Blood Glucose LESS THAN 70 milliGRAM(s)/deciliter  melatonin 3 milliGRAM(s) Oral at bedtime PRN Insomnia  ondansetron Injectable 4 milliGRAM(s) IV Push every 8 hours PRN Nausea and/or Vomiting  polyethylene glycol 3350 17 Gram(s) Oral two times a day PRN Constipation    Vitals:  T(F): 97.5 (07-12-24 @ 05:10), Max: 97.6 (07-11-24 @ 15:57)  HR: 75 (07-12-24 @ 06:12) (72 - 79)  BP: 107/75 (07-12-24 @ 06:12) (92/60 - 107/75)  RR: 18 (07-12-24 @ 05:10) (18 - 18)  SpO2: 95% (07-12-24 @ 05:10) (94% - 97%)    07-11 @ 07:01  -  07-12 @ 07:00  --------------------------------------------------------  IN:    IV PiggyBack: 100 mL  Total IN: 100 mL    OUT:    Stool (mL): 1 mL    Voided (mL): 600 mL  Total OUT: 601 mL    Total NET: -501 mL    Weight (kg): 106.1 (07-09 @ 23:34)  BMI (kg/m2): 32.6 (07-09 @ 23:34)    PHYSICAL EXAM:  Neuro: Awake, responsive  CV: S1 S2 RRR + SM  Lungs: diminished to bases   GI: Soft, BS +, ND, NT  Extremities: Trace LE edema    TELEMETRY: first degree HB    RADIOLOGY:   < from: CT Chest No Cont (07.11.24 @ 11:22) >  Mild bilateral bronchiectasis and lingular discoid atelectasis.    Cardiomegaly.    Sclerotic L2 vertebra concerning for metastatic disease. Paget's disease   is not excluded.    < end of copied text >    < from: CT Abdomen and Pelvis No Cont (07.10.24 @ 06:40) >  1.  Concentric urinary bladder mural thickening and adjacent moderate   perivesicular fat stranding. Correlate with urinalysis as cystitis   suspected  2.  Marked prostate enlargement (approx 225 cc)  3.  Sclerotic L2 vertebral body (ivory vertebrae). Prostate metastasis   favored over pagetoid change.  4.  2 cm LEFT bladder base calculus  5.  Centrally thickened appendix . Correlation with normal RLQ physical   exam as early appendicitis is unlikely but not excluded.  6.  Contracted gallbladder (without stones) and with trace   pericholecystic edema, nonspecific    < end of copied text >  DIAGNOSTIC TESTING:    [x ] Echocardiogram: < from: TTE Echo Complete w/ Contrast w/ Doppler (07.10.24 @ 10:43) >   1. Left ventricular cavity is moderately dilated. Left ventricular   systolic function is severely decreased with an ejection fraction   visually estimated at <20 %.   2. There is moderate (grade 2) left ventricular diastolic dysfunction.   3. There is no clear evidence of a left ventricular thrombus.   4. The left atrium is moderately dilated.   5. The right atrium is mildly dilated.   6. Normal right ventricular systolic function.   7. Moderate mitral regurgitation.   8. Trileaflet aortic valve with normal systolic excursion.   9. Trace tricuspid regurgitation.  10. Mild to moderate pulmonic regurgitation.  11. No pericardial effusion seen.    < end of copied text >    LABS:	 	    CARDIAC MARKERS:  Troponin I, High Sensitivity Result: 35.5 ng/L (07-09 @ 15:23)    12 Jul 2024 05:51    142    |  103    |  21     ----------------------------<  177    3.2     |  29     |  1.18   11 Jul 2024 05:40    141    |  105    |  21     ----------------------------<  129    3.5     |  27     |  1.07   10 Jul 2024 06:58    138    |  108    |  24     ----------------------------<  156    3.7     |  23     |  1.23     Ca    9.3        12 Jul 2024 05:51  Mg     1.9       12 Jul 2024 05:51    TPro  6.5    /  Alb  3.2    /  TBili  0.8    /  DBili  x      /  AST  13     /  ALT  14     /  AlkPhos  67     12 Jul 2024 05:51                        13.5   6.12  )-----------( 292      ( 12 Jul 2024 05:51 )             41.9 ,                       12.8   6.26  )-----------( 274      ( 11 Jul 2024 05:40 )             40.0 ,                       13.7   7.88  )-----------( 278      ( 10 Jul 2024 06:58 )             42.4 ,                       12.9   5.78  )-----------( 286      ( 09 Jul 2024 15:23 )             38.9     pro-BNP: Pro-Brain Natriuretic Peptide (07.09.24 @ 15:23)    Pro-Brain Natriuretic Peptide: 5480 pg/mL    Lipid Profile: 07-10 @ 06:58  HDL/Total Cholesterol: --  HDL Chol:              29 mg/dL  Serum Chol:            77 mg/dL  Direct LDL:            --  Triglycerides:         87 mg/dL    TSH: Thyroid Stimulating Hormone, Serum: 3.770 uIU/mL (07-11 @ 05:40)

## 2024-07-12 NOTE — DISCHARGE NOTE PROVIDER - NSDCCPCAREPLAN_GEN_ALL_CORE_FT
PRINCIPAL DISCHARGE DIAGNOSIS  Diagnosis: Acute on chronic respiratory failure with hypoxia  Assessment and Plan of Treatment:       SECONDARY DISCHARGE DIAGNOSES  Diagnosis: Abnormal CT scan, lung  Assessment and Plan of Treatment: You were found to have enlarged prostate with sclerotic lesion concerning for metastatic prostate cancer. These findings were discussed with you and you opted to follow up outpatient oncology.    Diagnosis: Chronic kidney disease, unspecified CKD stage  Assessment and Plan of Treatment:     Diagnosis: HTN (hypertension)  Assessment and Plan of Treatment:     Diagnosis: HLD (hyperlipidemia)  Assessment and Plan of Treatment:     Diagnosis: DM (diabetes mellitus)  Assessment and Plan of Treatment:     Diagnosis: Acute UTI  Assessment and Plan of Treatment:     Diagnosis: Acute on chronic systolic congestive heart failure  Assessment and Plan of Treatment:

## 2024-07-12 NOTE — DISCHARGE NOTE PROVIDER - CARE PROVIDER_API CALL
[facility]  Medical Oncology  [address]  [place] [address]  Phone: [phone]  Fax: [phone]  Follow Up Time:

## 2024-07-13 LAB
CULTURE RESULTS: NO GROWTH — SIGNIFICANT CHANGE UP
SPECIMEN SOURCE: SIGNIFICANT CHANGE UP

## 2024-07-15 LAB
CULTURE RESULTS: SIGNIFICANT CHANGE UP
CULTURE RESULTS: SIGNIFICANT CHANGE UP
SPECIMEN SOURCE: SIGNIFICANT CHANGE UP
SPECIMEN SOURCE: SIGNIFICANT CHANGE UP

## 2024-07-16 PROBLEM — E11.9 TYPE 2 DIABETES MELLITUS WITHOUT COMPLICATIONS: Chronic | Status: ACTIVE | Noted: 2024-07-10

## 2024-07-16 PROBLEM — E78.5 HYPERLIPIDEMIA, UNSPECIFIED: Chronic | Status: ACTIVE | Noted: 2024-07-10

## 2024-07-16 PROBLEM — I50.23 ACUTE ON CHRONIC SYSTOLIC (CONGESTIVE) HEART FAILURE: Chronic | Status: ACTIVE | Noted: 2024-07-10

## 2024-07-16 PROBLEM — I10 ESSENTIAL (PRIMARY) HYPERTENSION: Chronic | Status: ACTIVE | Noted: 2024-07-10

## 2024-07-16 PROBLEM — N40.0 BENIGN PROSTATIC HYPERPLASIA WITHOUT LOWER URINARY TRACT SYMPTOMS: Chronic | Status: ACTIVE | Noted: 2024-07-10

## 2024-07-19 DIAGNOSIS — I50.23 ACUTE ON CHRONIC SYSTOLIC (CONGESTIVE) HEART FAILURE: ICD-10-CM

## 2024-07-19 DIAGNOSIS — N40.0 BENIGN PROSTATIC HYPERPLASIA WITHOUT LOWER URINARY TRACT SYMPTOMS: ICD-10-CM

## 2024-07-19 DIAGNOSIS — R93.7 ABNORMAL FINDINGS ON DIAGNOSTIC IMAGING OF OTHER PARTS OF MUSCULOSKELETAL SYSTEM: ICD-10-CM

## 2024-07-19 DIAGNOSIS — E11.22 TYPE 2 DIABETES MELLITUS WITH DIABETIC CHRONIC KIDNEY DISEASE: ICD-10-CM

## 2024-07-19 DIAGNOSIS — R07.89 OTHER CHEST PAIN: ICD-10-CM

## 2024-07-19 DIAGNOSIS — N39.0 URINARY TRACT INFECTION, SITE NOT SPECIFIED: ICD-10-CM

## 2024-07-19 DIAGNOSIS — I13.0 HYPERTENSIVE HEART AND CHRONIC KIDNEY DISEASE WITH HEART FAILURE AND STAGE 1 THROUGH STAGE 4 CHRONIC KIDNEY DISEASE, OR UNSPECIFIED CHRONIC KIDNEY DISEASE: ICD-10-CM

## 2024-07-19 DIAGNOSIS — B96.89 OTHER SPECIFIED BACTERIAL AGENTS AS THE CAUSE OF DISEASES CLASSIFIED ELSEWHERE: ICD-10-CM
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
## 2024-07-19 DIAGNOSIS — E78.5 HYPERLIPIDEMIA, UNSPECIFIED: ICD-10-CM

## 2024-07-19 DIAGNOSIS — Z91.128 PATIENT'S INTENTIONAL UNDERDOSING OF MEDICATION REGIMEN FOR OTHER REASON: ICD-10-CM

## 2024-07-19 DIAGNOSIS — N18.9 CHRONIC KIDNEY DISEASE, UNSPECIFIED: ICD-10-CM

## 2024-07-19 DIAGNOSIS — T50.1X6A UNDERDOSING OF LOOP [HIGH-CEILING] DIURETICS, INITIAL ENCOUNTER: ICD-10-CM

## 2024-07-19 DIAGNOSIS — Z79.84 LONG TERM (CURRENT) USE OF ORAL HYPOGLYCEMIC DRUGS: ICD-10-CM

## 2024-07-24 ENCOUNTER — NON-APPOINTMENT (OUTPATIENT)
Age: 74
End: 2024-07-24

## 2024-07-24 ENCOUNTER — APPOINTMENT (OUTPATIENT)
Dept: HEMATOLOGY ONCOLOGY | Facility: CLINIC | Age: 74
End: 2024-07-24
Payer: MEDICARE

## 2024-07-24 VITALS
TEMPERATURE: 97.4 F | SYSTOLIC BLOOD PRESSURE: 100 MMHG | HEART RATE: 79 BPM | WEIGHT: 215 LBS | RESPIRATION RATE: 18 BRPM | HEIGHT: 68 IN | DIASTOLIC BLOOD PRESSURE: 67 MMHG | BODY MASS INDEX: 32.58 KG/M2 | OXYGEN SATURATION: 97 %

## 2024-07-24 DIAGNOSIS — Z12.5 ENCOUNTER FOR SCREENING FOR MALIGNANT NEOPLASM OF PROSTATE: ICD-10-CM

## 2024-07-24 LAB
ALBUMIN SERPL ELPH-MCNC: 4.1 G/DL
ALP BLD-CCNC: 62 U/L
ALT SERPL-CCNC: 21 U/L
ANION GAP SERPL CALC-SCNC: 13 MMOL/L
APTT BLD: 31 SEC
AST SERPL-CCNC: 33 U/L
BILIRUB SERPL-MCNC: 0.8 MG/DL
BUN SERPL-MCNC: 31 MG/DL
CALCIUM SERPL-MCNC: 10.7 MG/DL
CHLORIDE SERPL-SCNC: 103 MMOL/L
CO2 SERPL-SCNC: 28 MMOL/L
CREAT SERPL-MCNC: 1.2 MG/DL
EGFR: 64 ML/MIN/1.73M2
GLUCOSE SERPL-MCNC: 154 MG/DL
HCT VFR BLD CALC: 42.6 %
HGB BLD-MCNC: 13.9 G/DL
INR PPP: 0.99
MCHC RBC-ENTMCNC: 29.4 PG
MCHC RBC-ENTMCNC: 32.6 GM/DL
MCV RBC AUTO: 90.3 FL
PLATELET # BLD AUTO: 264 K/UL
POTASSIUM SERPL-SCNC: 4.4 MMOL/L
PROT SERPL-MCNC: 8.3 G/DL
PT BLD: 11.3 SEC
RBC # BLD: 4.72 M/UL
RBC # FLD: 15.5 %
SODIUM SERPL-SCNC: 144 MMOL/L
WBC # FLD AUTO: 4.7 K/UL

## 2024-07-24 PROCEDURE — 99205 OFFICE O/P NEW HI 60 MIN: CPT

## 2024-07-24 PROCEDURE — 99417 PROLNG OP E/M EACH 15 MIN: CPT

## 2024-07-24 RX ORDER — SACUBITRIL AND VALSARTAN 97; 103 MG/1; MG/1
97-103 TABLET, FILM COATED ORAL
Refills: 0 | Status: ACTIVE | COMMUNITY
Start: 2024-07-24

## 2024-07-24 RX ORDER — TORSEMIDE 20 MG/1
20 TABLET ORAL
Refills: 0 | Status: ACTIVE | COMMUNITY
Start: 2024-07-24

## 2024-07-24 RX ORDER — GLIPIZIDE 10 MG/1
10 TABLET, FILM COATED, EXTENDED RELEASE ORAL DAILY
Refills: 0 | Status: ACTIVE | COMMUNITY
Start: 2024-07-24

## 2024-07-24 RX ORDER — EMPAGLIFLOZIN 10 MG/1
10 TABLET, FILM COATED ORAL DAILY
Refills: 0 | Status: ACTIVE | COMMUNITY
Start: 2024-07-24

## 2024-07-24 RX ORDER — FINASTERIDE 5 MG/1
5 TABLET, FILM COATED ORAL DAILY
Refills: 0 | Status: ACTIVE | COMMUNITY
Start: 2024-07-24

## 2024-07-24 RX ORDER — TAMSULOSIN HYDROCHLORIDE 0.4 MG/1
0.4 CAPSULE ORAL
Refills: 0 | Status: ACTIVE | COMMUNITY
Start: 2024-07-24

## 2024-07-25 NOTE — ADDENDUM
[FreeTextEntry1] : I saw and evaluated the patient with the fellow. I was physically present for the key portions of the services provided. I reviewed the note and agree with the assessment and plan of care. I have edited the  note where appropriate.

## 2024-07-25 NOTE — REASON FOR VISIT
[Initial Consultation] : an initial consultation [Follow-Up Visit] : a follow-up [FreeTextEntry2] : Suspected metastatic prostate cancer

## 2024-07-25 NOTE — PHYSICAL EXAM
[Normal] : affect appropriate [Restricted in physically strenuous activity but ambulatory and able to carry out work of a light or sedentary nature] : Status 1- Restricted in physically strenuous activity but ambulatory and able to carry out work of a light or sedentary nature, e.g., light house work, office work [FreeTextEntry1] : deferred [de-identified] : deferred

## 2024-07-25 NOTE — REVIEW OF SYSTEMS
[Recent Change In Weight] : ~T recent weight change [Negative] : Heme/Lymph [Fever] : no fever [Chills] : no chills [Eye Pain] : no eye pain [Red Eyes] : eyes not red [Joint Pain] : no joint pain [Joint Stiffness] : no joint stiffness [Muscle Pain] : no muscle pain [FreeTextEntry8] : polyuria, weak stream.

## 2024-07-25 NOTE — HISTORY OF PRESENT ILLNESS
[de-identified] : Ther patient was seen and evaluated in the office today. He mentioned feeling well and denied having any new symptoms and concerns. He mentioned that he has lost 16 lbs in last 15 days which he thinks is mostly water weight as he has been on diuretics. He also mentioned having polyuria, however, has weak stream. No other concerns at this time. [de-identified] : Mihir Tyler is a 73- year- old male with PMHx of HTN, HLD, DM, severe CHF, BPH who is here for the initial consultation for a recent finding of enlarged prostate and a new L2 lesion which was incidentally noted on CT  done during a recent hospitalization for CHF exacerbation.  He reports feeling well and denies having any new symptoms and concerns. He mentioned that he has lost 16 lbs in last 15 days which he thinks is mostly water weight as he has been on diuretics. He also mentioned having polyuria, however, has weak stream. No other concerns at this time.   CT Chest 7/11/24 IMPRESSION: Mild bilateral bronchiectasis and lingular discoid atelectasis. Cardiomegaly. No mediastinal or hilar lymphadenopathy. Bilateral renal cysts were visualized Sclerotic L2 vertebra concerning for metastatic disease. Paget's disease is not excluded.  CT A/P 7/10/24 IMPRESSION: 1.  Concentric urinary bladder mural thickening and adjacent moderate perivesicular fat stranding. Correlate with urinalysis as cystitis suspected 2.  Marked prostate enlargement (approx 225 cc) 3.  Sclerotic L2 vertebral body (ivory vertebrae). Prostate metastasis favored over pagetoid change. 4.  2 cm LEFT bladder base calculus 5.  Centrally thickened appendix . Correlation with normal RLQ physical exam as early appendicitis is unlikely but not excluded. 6.  Contracted gallbladder (without stones) and with trace pericholecystic edema, nonspecific

## 2024-07-25 NOTE — ASSESSMENT
[FreeTextEntry1] : Mihir Tyler is a 73-year-old male with medical history significant for HTN, HLD, DM, combined systolic and diastolic CHF, and BPH who is incidentally found to have enlarged prostate and a lesion in L2 concerning for possible metastatic prostate cancer.   # Concern for metastatic prostate cancer.  - Will check serum PSA, testosterone level, coags, CBC, CMP today. - Referral for IR guided Biopsy of the L2 lesion. - If biopsy is consistent with prostate cancer, PSMA PET/CT will be ordered as an outpatient to complete staging. - Will plan further management once results are back. - Follow up in 3 weeks.  All of the patient's and his daughter's questions were adequately addressed and answered during today's consultation. The patient expressed agreement with the proposed plan. He was instructed to reach out to us if he had any further questions or if there were any changes in her clinical condition.

## 2024-07-25 NOTE — HISTORY OF PRESENT ILLNESS
[de-identified] : Ther patient was seen and evaluated in the office today. He mentioned feeling well and denied having any new symptoms and concerns. He mentioned that he has lost 16 lbs in last 15 days which he thinks is mostly water weight as he has been on diuretics. He also mentioned having polyuria, however, has weak stream. No other concerns at this time. [de-identified] : Mihir Tyler is a 73- year- old male with PMHx of HTN, HLD, DM, severe CHF, BPH who is here for the initial consultation for a recent finding of enlarged prostate and a new L2 lesion which was incidentally noted on CT  done during a recent hospitalization for CHF exacerbation.  He reports feeling well and denies having any new symptoms and concerns. He mentioned that he has lost 16 lbs in last 15 days which he thinks is mostly water weight as he has been on diuretics. He also mentioned having polyuria, however, has weak stream. No other concerns at this time.   CT Chest 7/11/24 IMPRESSION: Mild bilateral bronchiectasis and lingular discoid atelectasis. Cardiomegaly. No mediastinal or hilar lymphadenopathy. Bilateral renal cysts were visualized Sclerotic L2 vertebra concerning for metastatic disease. Paget's disease is not excluded.  CT A/P 7/10/24 IMPRESSION: 1.  Concentric urinary bladder mural thickening and adjacent moderate perivesicular fat stranding. Correlate with urinalysis as cystitis suspected 2.  Marked prostate enlargement (approx 225 cc) 3.  Sclerotic L2 vertebral body (ivory vertebrae). Prostate metastasis favored over pagetoid change. 4.  2 cm LEFT bladder base calculus 5.  Centrally thickened appendix . Correlation with normal RLQ physical exam as early appendicitis is unlikely but not excluded. 6.  Contracted gallbladder (without stones) and with trace pericholecystic edema, nonspecific

## 2024-07-25 NOTE — PHYSICAL EXAM
[Normal] : affect appropriate [Restricted in physically strenuous activity but ambulatory and able to carry out work of a light or sedentary nature] : Status 1- Restricted in physically strenuous activity but ambulatory and able to carry out work of a light or sedentary nature, e.g., light house work, office work [FreeTextEntry1] : deferred [de-identified] : deferred

## 2024-07-26 ENCOUNTER — NON-APPOINTMENT (OUTPATIENT)
Age: 74
End: 2024-07-26

## 2024-07-30 LAB
PSA FREE FLD-MCNC: 30 %
PSA FREE SERPL-MCNC: 0.68 NG/ML
PSA SERPL-MCNC: 2.25 NG/ML
TESTOST SERPL-MCNC: 502 NG/DL

## 2024-08-05 ENCOUNTER — RESULT REVIEW (OUTPATIENT)
Age: 74
End: 2024-08-05

## 2024-08-05 ENCOUNTER — APPOINTMENT (OUTPATIENT)
Dept: INTERVENTIONAL RADIOLOGY/VASCULAR | Facility: HOSPITAL | Age: 74
End: 2024-08-05

## 2024-08-05 ENCOUNTER — OUTPATIENT (OUTPATIENT)
Dept: OUTPATIENT SERVICES | Facility: HOSPITAL | Age: 74
LOS: 1 days | End: 2024-08-05
Payer: MEDICARE

## 2024-08-05 ENCOUNTER — APPOINTMENT (OUTPATIENT)
Dept: CT IMAGING | Facility: HOSPITAL | Age: 74
End: 2024-08-05

## 2024-08-05 PROCEDURE — 20225 BONE BIOPSY TROCAR/NDL DEEP: CPT

## 2024-08-05 PROCEDURE — 77012 CT SCAN FOR NEEDLE BIOPSY: CPT

## 2024-08-05 PROCEDURE — 88173 CYTOPATH EVAL FNA REPORT: CPT | Mod: 26

## 2024-08-05 PROCEDURE — 77012 CT SCAN FOR NEEDLE BIOPSY: CPT | Mod: 26

## 2024-08-05 PROCEDURE — 88305 TISSUE EXAM BY PATHOLOGIST: CPT | Mod: 26

## 2024-08-05 PROCEDURE — C1830: CPT

## 2024-08-06 LAB — NON-GYNECOLOGICAL CYTOLOGY STUDY: SIGNIFICANT CHANGE UP

## 2024-08-13 DIAGNOSIS — Z12.5 ENCOUNTER FOR SCREENING FOR MALIGNANT NEOPLASM OF PROSTATE: ICD-10-CM

## 2024-08-14 ENCOUNTER — APPOINTMENT (OUTPATIENT)
Dept: HEMATOLOGY ONCOLOGY | Facility: CLINIC | Age: 74
End: 2024-08-14
Payer: MEDICARE

## 2024-08-14 PROCEDURE — 99441: CPT

## 2024-08-20 NOTE — ASSESSMENT
[FreeTextEntry1] : Mihir Tyler is a 73-year-old male with medical history significant for HTN, HLD, DM, combined systolic and diastolic CHF, and BPH who is incidentally found to have enlarged prostate and a lesion in L2 concerning for possible metastatic prostate cancer. Biopsy of L2 was performed and was non - diagnostic.  # Concern for metastatic prostate cancer.  - L2 biopsy was non - diagnostic. In light of this, we will perform a PSMA PET.   #Hypercalcemia - Calcium on 7/24 was 10.7.  - Etiology? We will continue to monitor.

## 2024-08-20 NOTE — REASON FOR VISIT
[Follow-Up Visit] : a follow-up [Home] : at home, [unfilled] , at the time of the visit. [Medical Office: ([facility])___] : at the medical office located in  [Family Member] : family member [FreeTextEntry2] : Suspected metastatic prostate cancer

## 2024-08-20 NOTE — PHYSICAL EXAM
[Restricted in physically strenuous activity but ambulatory and able to carry out work of a light or sedentary nature] : Status 1- Restricted in physically strenuous activity but ambulatory and able to carry out work of a light or sedentary nature, e.g., light house work, office work [Normal] : grossly intact [FreeTextEntry1] : deferred [de-identified] : deferred

## 2024-08-20 NOTE — PHYSICAL EXAM
[Restricted in physically strenuous activity but ambulatory and able to carry out work of a light or sedentary nature] : Status 1- Restricted in physically strenuous activity but ambulatory and able to carry out work of a light or sedentary nature, e.g., light house work, office work [Normal] : grossly intact [FreeTextEntry1] : deferred [de-identified] : deferred

## 2024-08-20 NOTE — HISTORY OF PRESENT ILLNESS
[de-identified] : Mihir Tyler is a 73- year- old male with PMHx of HTN, HLD, DM, severe CHF, BPH who is here for the initial consultation for a recent finding of enlarged prostate and a new L2 lesion which was incidentally noted on CT  done during a recent hospitalization for CHF exacerbation.  He reports feeling well and denies having any new symptoms and concerns. He mentioned that he has lost 16 lbs in last 15 days which he thinks is mostly water weight as he has been on diuretics. He also mentioned having polyuria, however, has weak stream. No other concerns at this time.   CT Chest 7/11/24 IMPRESSION: Mild bilateral bronchiectasis and lingular discoid atelectasis. Cardiomegaly. No mediastinal or hilar lymphadenopathy. Bilateral renal cysts were visualized Sclerotic L2 vertebra concerning for metastatic disease. Paget's disease is not excluded.  CT A/P 7/10/24 IMPRESSION: 1.  Concentric urinary bladder mural thickening and adjacent moderate perivesicular fat stranding. Correlate with urinalysis as cystitis suspected 2.  Marked prostate enlargement (approx 225 cc) 3.  Sclerotic L2 vertebral body (ivory vertebrae). Prostate metastasis favored over pagetoid change. 4.  2 cm LEFT bladder base calculus 5.  Centrally thickened appendix . Correlation with normal RLQ physical exam as early appendicitis is unlikely but not excluded. 6.  Contracted gallbladder (without stones) and with trace pericholecystic edema, nonspecific [de-identified] : Patient is here today for a follow up visit; last seen on 7/24/2024.  Patient underwent a biopsy of his bone which was non - diagnostic.

## 2024-08-20 NOTE — PHYSICAL EXAM
[Restricted in physically strenuous activity but ambulatory and able to carry out work of a light or sedentary nature] : Status 1- Restricted in physically strenuous activity but ambulatory and able to carry out work of a light or sedentary nature, e.g., light house work, office work [Normal] : affect appropriate [FreeTextEntry1] : deferred [de-identified] : deferred

## 2024-08-20 NOTE — REASON FOR VISIT
[Follow-Up Visit] : a follow-up [Home] : at home, [unfilled] , at the time of the visit. [Medical Office: ([facility])___] : at the medical office located in  [Family Member] : family member [FreeTextEntry2] : Suspected metastatic prostate cancer

## 2024-08-20 NOTE — HISTORY OF PRESENT ILLNESS
[de-identified] : Mihir Tyler is a 73- year- old male with PMHx of HTN, HLD, DM, severe CHF, BPH who is here for the initial consultation for a recent finding of enlarged prostate and a new L2 lesion which was incidentally noted on CT  done during a recent hospitalization for CHF exacerbation.  He reports feeling well and denies having any new symptoms and concerns. He mentioned that he has lost 16 lbs in last 15 days which he thinks is mostly water weight as he has been on diuretics. He also mentioned having polyuria, however, has weak stream. No other concerns at this time.   CT Chest 7/11/24 IMPRESSION: Mild bilateral bronchiectasis and lingular discoid atelectasis. Cardiomegaly. No mediastinal or hilar lymphadenopathy. Bilateral renal cysts were visualized Sclerotic L2 vertebra concerning for metastatic disease. Paget's disease is not excluded.  CT A/P 7/10/24 IMPRESSION: 1.  Concentric urinary bladder mural thickening and adjacent moderate perivesicular fat stranding. Correlate with urinalysis as cystitis suspected 2.  Marked prostate enlargement (approx 225 cc) 3.  Sclerotic L2 vertebral body (ivory vertebrae). Prostate metastasis favored over pagetoid change. 4.  2 cm LEFT bladder base calculus 5.  Centrally thickened appendix . Correlation with normal RLQ physical exam as early appendicitis is unlikely but not excluded. 6.  Contracted gallbladder (without stones) and with trace pericholecystic edema, nonspecific [de-identified] : Patient is here today for a follow up visit; last seen on 7/24/2024.  Patient underwent a biopsy of his bone which was non - diagnostic.

## 2024-08-20 NOTE — REVIEW OF SYSTEMS
[Recent Change In Weight] : ~T recent weight change [Fever] : no fever [Chills] : no chills [Eye Pain] : no eye pain [Red Eyes] : eyes not red [Joint Pain] : no joint pain [Joint Stiffness] : no joint stiffness [Muscle Pain] : no muscle pain [FreeTextEntry8] : polyuria, weak stream. [Negative] : Allergic/Immunologic

## 2024-08-20 NOTE — REASON FOR VISIT
[Follow-Up Visit] : a follow-up [Home] : at home, [unfilled] , at the time of the visit. [Medical Office: ([facility])___] : at the medical office located in  [Family Member] : family member [FreeTextEntry2] : Suspected metastatic prostate cancer

## 2024-08-20 NOTE — HISTORY OF PRESENT ILLNESS
[de-identified] : Mihir Tyler is a 73- year- old male with PMHx of HTN, HLD, DM, severe CHF, BPH who is here for the initial consultation for a recent finding of enlarged prostate and a new L2 lesion which was incidentally noted on CT  done during a recent hospitalization for CHF exacerbation.  He reports feeling well and denies having any new symptoms and concerns. He mentioned that he has lost 16 lbs in last 15 days which he thinks is mostly water weight as he has been on diuretics. He also mentioned having polyuria, however, has weak stream. No other concerns at this time.   CT Chest 7/11/24 IMPRESSION: Mild bilateral bronchiectasis and lingular discoid atelectasis. Cardiomegaly. No mediastinal or hilar lymphadenopathy. Bilateral renal cysts were visualized Sclerotic L2 vertebra concerning for metastatic disease. Paget's disease is not excluded.  CT A/P 7/10/24 IMPRESSION: 1.  Concentric urinary bladder mural thickening and adjacent moderate perivesicular fat stranding. Correlate with urinalysis as cystitis suspected 2.  Marked prostate enlargement (approx 225 cc) 3.  Sclerotic L2 vertebral body (ivory vertebrae). Prostate metastasis favored over pagetoid change. 4.  2 cm LEFT bladder base calculus 5.  Centrally thickened appendix . Correlation with normal RLQ physical exam as early appendicitis is unlikely but not excluded. 6.  Contracted gallbladder (without stones) and with trace pericholecystic edema, nonspecific [de-identified] : Patient is here today for a follow up visit; last seen on 7/24/2024.  Patient underwent a biopsy of his bone which was non - diagnostic.

## 2024-10-11 ENCOUNTER — APPOINTMENT (OUTPATIENT)
Dept: NUCLEAR MEDICINE | Facility: HOSPITAL | Age: 74
End: 2024-10-11

## 2024-10-11 ENCOUNTER — OUTPATIENT (OUTPATIENT)
Dept: OUTPATIENT SERVICES | Facility: HOSPITAL | Age: 74
LOS: 1 days | End: 2024-10-11

## 2024-10-11 PROCEDURE — 78816 PET IMAGE W/CT FULL BODY: CPT | Mod: 26

## 2024-10-11 PROCEDURE — 78816 PET IMAGE W/CT FULL BODY: CPT

## 2024-10-11 PROCEDURE — A9595: CPT

## 2024-10-22 DIAGNOSIS — C61 MALIGNANT NEOPLASM OF PROSTATE: ICD-10-CM

## 2025-07-02 ENCOUNTER — INPATIENT (INPATIENT)
Facility: HOSPITAL | Age: 75
LOS: 8 days | Discharge: HOME HEALTH | End: 2025-07-11
Attending: STUDENT IN AN ORGANIZED HEALTH CARE EDUCATION/TRAINING PROGRAM | Admitting: STUDENT IN AN ORGANIZED HEALTH CARE EDUCATION/TRAINING PROGRAM
Payer: MEDICARE

## 2025-07-02 VITALS — HEART RATE: 93 BPM | HEIGHT: 71 IN | WEIGHT: 225.09 LBS | OXYGEN SATURATION: 99 %

## 2025-07-02 PROCEDURE — 99285 EMERGENCY DEPT VISIT HI MDM: CPT

## 2025-07-03 DIAGNOSIS — Z29.9 ENCOUNTER FOR PROPHYLACTIC MEASURES, UNSPECIFIED: ICD-10-CM

## 2025-07-03 DIAGNOSIS — E11.9 TYPE 2 DIABETES MELLITUS WITHOUT COMPLICATIONS: ICD-10-CM

## 2025-07-03 DIAGNOSIS — N17.9 ACUTE KIDNEY FAILURE, UNSPECIFIED: ICD-10-CM

## 2025-07-03 DIAGNOSIS — I50.9 HEART FAILURE, UNSPECIFIED: ICD-10-CM

## 2025-07-03 DIAGNOSIS — C61 MALIGNANT NEOPLASM OF PROSTATE: ICD-10-CM

## 2025-07-03 DIAGNOSIS — R79.89 OTHER SPECIFIED ABNORMAL FINDINGS OF BLOOD CHEMISTRY: ICD-10-CM

## 2025-07-03 DIAGNOSIS — I50.23 ACUTE ON CHRONIC SYSTOLIC (CONGESTIVE) HEART FAILURE: ICD-10-CM

## 2025-07-03 LAB
ALBUMIN SERPL ELPH-MCNC: 3.6 G/DL — SIGNIFICANT CHANGE UP (ref 3.3–5)
ALBUMIN SERPL ELPH-MCNC: 3.7 G/DL — SIGNIFICANT CHANGE UP (ref 3.3–5)
ALP SERPL-CCNC: 116 U/L — SIGNIFICANT CHANGE UP (ref 40–120)
ALP SERPL-CCNC: 127 U/L — HIGH (ref 40–120)
ALT FLD-CCNC: 26 U/L — SIGNIFICANT CHANGE UP (ref 12–78)
ALT FLD-CCNC: 28 U/L — SIGNIFICANT CHANGE UP (ref 12–78)
ANION GAP SERPL CALC-SCNC: 10 MMOL/L — SIGNIFICANT CHANGE UP (ref 5–17)
ANION GAP SERPL CALC-SCNC: 10 MMOL/L — SIGNIFICANT CHANGE UP (ref 5–17)
AST SERPL-CCNC: 15 U/L — SIGNIFICANT CHANGE UP (ref 15–37)
AST SERPL-CCNC: 17 U/L — SIGNIFICANT CHANGE UP (ref 15–37)
BASOPHILS # BLD AUTO: 0.03 K/UL — SIGNIFICANT CHANGE UP (ref 0–0.2)
BASOPHILS # BLD AUTO: 0.04 K/UL — SIGNIFICANT CHANGE UP (ref 0–0.2)
BASOPHILS NFR BLD AUTO: 0.4 % — SIGNIFICANT CHANGE UP (ref 0–2)
BASOPHILS NFR BLD AUTO: 0.5 % — SIGNIFICANT CHANGE UP (ref 0–2)
BILIRUB SERPL-MCNC: 1 MG/DL — SIGNIFICANT CHANGE UP (ref 0.2–1.2)
BILIRUB SERPL-MCNC: 1.1 MG/DL — SIGNIFICANT CHANGE UP (ref 0.2–1.2)
BUN SERPL-MCNC: 48 MG/DL — HIGH (ref 7–23)
BUN SERPL-MCNC: 50 MG/DL — HIGH (ref 7–23)
CALCIUM SERPL-MCNC: 9.8 MG/DL — SIGNIFICANT CHANGE UP (ref 8.5–10.1)
CALCIUM SERPL-MCNC: 9.9 MG/DL — SIGNIFICANT CHANGE UP (ref 8.5–10.1)
CHLORIDE SERPL-SCNC: 104 MMOL/L — SIGNIFICANT CHANGE UP (ref 96–108)
CHLORIDE SERPL-SCNC: 105 MMOL/L — SIGNIFICANT CHANGE UP (ref 96–108)
CO2 SERPL-SCNC: 21 MMOL/L — LOW (ref 22–31)
CO2 SERPL-SCNC: 22 MMOL/L — SIGNIFICANT CHANGE UP (ref 22–31)
CREAT SERPL-MCNC: 2.18 MG/DL — HIGH (ref 0.5–1.3)
CREAT SERPL-MCNC: 2.42 MG/DL — HIGH (ref 0.5–1.3)
D DIMER BLD IA.RAPID-MCNC: 2282 NG/ML DDU — HIGH
EGFR: 28 ML/MIN/1.73M2 — LOW
EGFR: 28 ML/MIN/1.73M2 — LOW
EGFR: 31 ML/MIN/1.73M2 — LOW
EGFR: 31 ML/MIN/1.73M2 — LOW
EOSINOPHIL # BLD AUTO: 0.14 K/UL — SIGNIFICANT CHANGE UP (ref 0–0.5)
EOSINOPHIL # BLD AUTO: 0.24 K/UL — SIGNIFICANT CHANGE UP (ref 0–0.5)
EOSINOPHIL NFR BLD AUTO: 1.8 % — SIGNIFICANT CHANGE UP (ref 0–6)
EOSINOPHIL NFR BLD AUTO: 3.3 % — SIGNIFICANT CHANGE UP (ref 0–6)
GLUCOSE BLDC GLUCOMTR-MCNC: 197 MG/DL — HIGH (ref 70–99)
GLUCOSE BLDC GLUCOMTR-MCNC: 198 MG/DL — HIGH (ref 70–99)
GLUCOSE BLDC GLUCOMTR-MCNC: 262 MG/DL — HIGH (ref 70–99)
GLUCOSE SERPL-MCNC: 241 MG/DL — HIGH (ref 70–99)
GLUCOSE SERPL-MCNC: 249 MG/DL — HIGH (ref 70–99)
HCT VFR BLD CALC: 43.5 % — SIGNIFICANT CHANGE UP (ref 39–50)
HCT VFR BLD CALC: 43.7 % — SIGNIFICANT CHANGE UP (ref 39–50)
HGB BLD-MCNC: 13.9 G/DL — SIGNIFICANT CHANGE UP (ref 13–17)
HGB BLD-MCNC: 14.2 G/DL — SIGNIFICANT CHANGE UP (ref 13–17)
IMM GRANULOCYTES NFR BLD AUTO: 0.3 % — SIGNIFICANT CHANGE UP (ref 0–0.9)
IMM GRANULOCYTES NFR BLD AUTO: 0.4 % — SIGNIFICANT CHANGE UP (ref 0–0.9)
LYMPHOCYTES # BLD AUTO: 1.64 K/UL — SIGNIFICANT CHANGE UP (ref 1–3.3)
LYMPHOCYTES # BLD AUTO: 1.82 K/UL — SIGNIFICANT CHANGE UP (ref 1–3.3)
LYMPHOCYTES # BLD AUTO: 22.9 % — SIGNIFICANT CHANGE UP (ref 13–44)
LYMPHOCYTES # BLD AUTO: 22.9 % — SIGNIFICANT CHANGE UP (ref 13–44)
MCHC RBC-ENTMCNC: 29.8 PG — SIGNIFICANT CHANGE UP (ref 27–34)
MCHC RBC-ENTMCNC: 30.5 PG — SIGNIFICANT CHANGE UP (ref 27–34)
MCHC RBC-ENTMCNC: 31.8 G/DL — LOW (ref 32–36)
MCHC RBC-ENTMCNC: 32.6 G/DL — SIGNIFICANT CHANGE UP (ref 32–36)
MCV RBC AUTO: 93.3 FL — SIGNIFICANT CHANGE UP (ref 80–100)
MCV RBC AUTO: 93.8 FL — SIGNIFICANT CHANGE UP (ref 80–100)
MONOCYTES # BLD AUTO: 0.65 K/UL — SIGNIFICANT CHANGE UP (ref 0–0.9)
MONOCYTES # BLD AUTO: 0.69 K/UL — SIGNIFICANT CHANGE UP (ref 0–0.9)
MONOCYTES NFR BLD AUTO: 8.2 % — SIGNIFICANT CHANGE UP (ref 2–14)
MONOCYTES NFR BLD AUTO: 9.6 % — SIGNIFICANT CHANGE UP (ref 2–14)
NEUTROPHILS # BLD AUTO: 4.55 K/UL — SIGNIFICANT CHANGE UP (ref 1.8–7.4)
NEUTROPHILS # BLD AUTO: 5.26 K/UL — SIGNIFICANT CHANGE UP (ref 1.8–7.4)
NEUTROPHILS NFR BLD AUTO: 63.5 % — SIGNIFICANT CHANGE UP (ref 43–77)
NEUTROPHILS NFR BLD AUTO: 66.2 % — SIGNIFICANT CHANGE UP (ref 43–77)
NRBC BLD AUTO-RTO: 0 /100 WBCS — SIGNIFICANT CHANGE UP (ref 0–0)
NRBC BLD AUTO-RTO: 0 /100 WBCS — SIGNIFICANT CHANGE UP (ref 0–0)
NT-PROBNP SERPL-SCNC: HIGH PG/ML (ref 0–125)
PLATELET # BLD AUTO: 230 K/UL — SIGNIFICANT CHANGE UP (ref 150–400)
PLATELET # BLD AUTO: 264 K/UL — SIGNIFICANT CHANGE UP (ref 150–400)
POTASSIUM SERPL-MCNC: 4.7 MMOL/L — SIGNIFICANT CHANGE UP (ref 3.5–5.3)
POTASSIUM SERPL-MCNC: 4.8 MMOL/L — SIGNIFICANT CHANGE UP (ref 3.5–5.3)
POTASSIUM SERPL-SCNC: 4.7 MMOL/L — SIGNIFICANT CHANGE UP (ref 3.5–5.3)
POTASSIUM SERPL-SCNC: 4.8 MMOL/L — SIGNIFICANT CHANGE UP (ref 3.5–5.3)
PROT SERPL-MCNC: 7.6 GM/DL — SIGNIFICANT CHANGE UP (ref 6–8.3)
PROT SERPL-MCNC: 7.8 GM/DL — SIGNIFICANT CHANGE UP (ref 6–8.3)
RBC # BLD: 4.66 M/UL — SIGNIFICANT CHANGE UP (ref 4.2–5.8)
RBC # BLD: 4.66 M/UL — SIGNIFICANT CHANGE UP (ref 4.2–5.8)
RBC # FLD: 17.2 % — HIGH (ref 10.3–14.5)
RBC # FLD: 17.2 % — HIGH (ref 10.3–14.5)
SODIUM SERPL-SCNC: 135 MMOL/L — SIGNIFICANT CHANGE UP (ref 135–145)
SODIUM SERPL-SCNC: 137 MMOL/L — SIGNIFICANT CHANGE UP (ref 135–145)
TROPONIN I, HIGH SENSITIVITY RESULT: 84.1 NG/L — HIGH
TROPONIN I, HIGH SENSITIVITY RESULT: 95.9 NG/L — HIGH
WBC # BLD: 7.17 K/UL — SIGNIFICANT CHANGE UP (ref 3.8–10.5)
WBC # BLD: 7.94 K/UL — SIGNIFICANT CHANGE UP (ref 3.8–10.5)
WBC # FLD AUTO: 7.17 K/UL — SIGNIFICANT CHANGE UP (ref 3.8–10.5)
WBC # FLD AUTO: 7.94 K/UL — SIGNIFICANT CHANGE UP (ref 3.8–10.5)

## 2025-07-03 PROCEDURE — 99233 SBSQ HOSP IP/OBS HIGH 50: CPT

## 2025-07-03 PROCEDURE — 99222 1ST HOSP IP/OBS MODERATE 55: CPT

## 2025-07-03 PROCEDURE — 76775 US EXAM ABDO BACK WALL LIM: CPT | Mod: 26

## 2025-07-03 PROCEDURE — 71045 X-RAY EXAM CHEST 1 VIEW: CPT | Mod: 26

## 2025-07-03 PROCEDURE — 78582 LUNG VENTILAT&PERFUS IMAGING: CPT | Mod: 26

## 2025-07-03 RX ORDER — FINASTERIDE 1 MG/1
5 TABLET, FILM COATED ORAL DAILY
Refills: 0 | Status: DISCONTINUED | OUTPATIENT
Start: 2025-07-03 | End: 2025-07-11

## 2025-07-03 RX ORDER — TORSEMIDE 10 MG
0 TABLET ORAL
Qty: 0 | Refills: 4 | DISCHARGE

## 2025-07-03 RX ORDER — FUROSEMIDE 10 MG/ML
40 INJECTION INTRAMUSCULAR; INTRAVENOUS ONCE
Refills: 0 | Status: COMPLETED | OUTPATIENT
Start: 2025-07-03 | End: 2025-07-03

## 2025-07-03 RX ORDER — HEPARIN SODIUM 1000 [USP'U]/ML
5000 INJECTION INTRAVENOUS; SUBCUTANEOUS EVERY 12 HOURS
Refills: 0 | Status: DISCONTINUED | OUTPATIENT
Start: 2025-07-03 | End: 2025-07-07

## 2025-07-03 RX ORDER — DEXTROSE 50 % IN WATER 50 %
25 SYRINGE (ML) INTRAVENOUS ONCE
Refills: 0 | Status: DISCONTINUED | OUTPATIENT
Start: 2025-07-03 | End: 2025-07-11

## 2025-07-03 RX ORDER — TAMSULOSIN HYDROCHLORIDE 0.4 MG/1
0.4 CAPSULE ORAL AT BEDTIME
Refills: 0 | Status: DISCONTINUED | OUTPATIENT
Start: 2025-07-03 | End: 2025-07-11

## 2025-07-03 RX ORDER — SPIRONOLACTONE 25 MG
25 TABLET ORAL DAILY
Refills: 0 | Status: DISCONTINUED | OUTPATIENT
Start: 2025-07-03 | End: 2025-07-11

## 2025-07-03 RX ORDER — FUROSEMIDE 10 MG/ML
40 INJECTION INTRAMUSCULAR; INTRAVENOUS EVERY 12 HOURS
Refills: 0 | Status: DISCONTINUED | OUTPATIENT
Start: 2025-07-03 | End: 2025-07-07

## 2025-07-03 RX ORDER — ATORVASTATIN CALCIUM 80 MG/1
80 TABLET, FILM COATED ORAL AT BEDTIME
Refills: 0 | Status: DISCONTINUED | OUTPATIENT
Start: 2025-07-03 | End: 2025-07-11

## 2025-07-03 RX ORDER — ACETAMINOPHEN 500 MG/5ML
650 LIQUID (ML) ORAL EVERY 6 HOURS
Refills: 0 | Status: DISCONTINUED | OUTPATIENT
Start: 2025-07-03 | End: 2025-07-11

## 2025-07-03 RX ORDER — DEXTROSE 50 % IN WATER 50 %
15 SYRINGE (ML) INTRAVENOUS ONCE
Refills: 0 | Status: DISCONTINUED | OUTPATIENT
Start: 2025-07-03 | End: 2025-07-11

## 2025-07-03 RX ORDER — TAMSULOSIN HYDROCHLORIDE 0.4 MG/1
0 CAPSULE ORAL
Qty: 0 | Refills: 2 | DISCHARGE

## 2025-07-03 RX ORDER — CARVEDILOL 3.12 MG/1
12.5 TABLET, FILM COATED ORAL EVERY 12 HOURS
Refills: 0 | Status: DISCONTINUED | OUTPATIENT
Start: 2025-07-03 | End: 2025-07-06

## 2025-07-03 RX ORDER — METFORMIN HYDROCHLORIDE 850 MG/1
0 TABLET ORAL
Qty: 0 | Refills: 1 | DISCHARGE

## 2025-07-03 RX ORDER — GLUCAGON 3 MG/1
1 POWDER NASAL ONCE
Refills: 0 | Status: DISCONTINUED | OUTPATIENT
Start: 2025-07-03 | End: 2025-07-11

## 2025-07-03 RX ORDER — SODIUM CHLORIDE 9 G/1000ML
1000 INJECTION, SOLUTION INTRAVENOUS
Refills: 0 | Status: DISCONTINUED | OUTPATIENT
Start: 2025-07-03 | End: 2025-07-11

## 2025-07-03 RX ORDER — INSULIN LISPRO 100 U/ML
INJECTION, SOLUTION INTRAVENOUS; SUBCUTANEOUS
Refills: 0 | Status: DISCONTINUED | OUTPATIENT
Start: 2025-07-03 | End: 2025-07-11

## 2025-07-03 RX ORDER — DEXTROSE 50 % IN WATER 50 %
12.5 SYRINGE (ML) INTRAVENOUS ONCE
Refills: 0 | Status: DISCONTINUED | OUTPATIENT
Start: 2025-07-03 | End: 2025-07-11

## 2025-07-03 RX ORDER — SPIRONOLACTONE 25 MG
0 TABLET ORAL
Qty: 0 | Refills: 2 | DISCHARGE

## 2025-07-03 RX ORDER — ATORVASTATIN CALCIUM 80 MG/1
0 TABLET, FILM COATED ORAL
Qty: 0 | Refills: 3 | DISCHARGE

## 2025-07-03 RX ADMIN — INSULIN LISPRO 3: 100 INJECTION, SOLUTION INTRAVENOUS; SUBCUTANEOUS at 16:45

## 2025-07-03 RX ADMIN — FUROSEMIDE 40 MILLIGRAM(S): 10 INJECTION INTRAMUSCULAR; INTRAVENOUS at 17:19

## 2025-07-03 RX ADMIN — INSULIN LISPRO 1: 100 INJECTION, SOLUTION INTRAVENOUS; SUBCUTANEOUS at 11:17

## 2025-07-03 RX ADMIN — FUROSEMIDE 40 MILLIGRAM(S): 10 INJECTION INTRAMUSCULAR; INTRAVENOUS at 04:18

## 2025-07-03 RX ADMIN — FINASTERIDE 5 MILLIGRAM(S): 1 TABLET, FILM COATED ORAL at 11:17

## 2025-07-03 RX ADMIN — TAMSULOSIN HYDROCHLORIDE 0.4 MILLIGRAM(S): 0.4 CAPSULE ORAL at 21:43

## 2025-07-03 RX ADMIN — HEPARIN SODIUM 5000 UNIT(S): 1000 INJECTION INTRAVENOUS; SUBCUTANEOUS at 17:20

## 2025-07-03 RX ADMIN — Medication 500 MILLILITER(S): at 04:21

## 2025-07-03 RX ADMIN — ATORVASTATIN CALCIUM 80 MILLIGRAM(S): 80 TABLET, FILM COATED ORAL at 21:44

## 2025-07-03 NOTE — H&P ADULT - HISTORY OF PRESENT ILLNESS
74-year-old male with past medical history of systolic CHF(last known EF 20%), hypertension, hyperlipidemia, type 2 diabetes, and BPH, ?prostate ca who presented to the ED due to shortness of breath for the last 5days. The patient lives in the [place] but son-in-law is a respiratory therapist in this hospital so decided to bring him in. Patient reports that it took him 20 minutes to get to the corner store( which usually takes five minutes) due to shortness of breath. States SOB is only with exertion. + orthopnea. The patient denies any fevers, chills, coughing, chest pain, GI/ symptoms, or other complaints.   On presentation, vital signs were stable, O2 sat 99% on room air, slightly tachypneic. Labs notable for D-dimer of 2282, troponin 95>.84, creatinine 2.4, BNP 10,156. The patient received Lasix and 500 cc bolus in the ED.

## 2025-07-03 NOTE — H&P ADULT - PROBLEM SELECTOR PLAN 4
- Cr elevated on admission   - Possibly prerenal in the setting of CHF  - c/w diuresis  - Avoid nephrotoxin  - i/os  - f/u am labs  - Obtain renal u/s and nephro consult if worsening

## 2025-07-03 NOTE — ED ADULT NURSE NOTE - OBJECTIVE STATEMENT
Covering for primary RN. Pt is a 73yo Male AAOx3 NKDA pmh a fib, BPH, CHF, dm pw RIVAS, sob for the past five days. Pt reports getting tired while walking or doing things he recently have been able to do no problem. Pt reports symptoms started during recent heat wave. Pt respirations equal and unlabored bilaterally. Pt placed on monitor, a fib to 88 observed. Pt RA saturations 99%, no tachypnea at this time. Pt updated on plan of care. SNEHA EAGLE.

## 2025-07-03 NOTE — CONSULT NOTE ADULT - SUBJECTIVE AND OBJECTIVE BOX
Date of Admission: 7/2/2025    CHIEF COMPLAINT: SOB    HISTORY OF PRESENT ILLNESS:  Briefly, MR Tyler is a pleasant Black man 74-year-old male with past medical history of HFrEF/NICM, hypertension, hyperlipidemia, type 2 diabetes, and BPH,?prostate ca who presented to the ED due to shortness of breath for the last 5days. The patient lives in the [place] but son-in-law is a respiratory therapist in this hospital so decided to bring him in. Patient reports that it took him 20 minutes to get to the corner store( which usually takes five minutes) due to shortness of breath. States SOB is only with exertion. + orthopnea. The patient denies any fevers, chills, coughing, chest pain, GI/ symptoms, or other complaints.   On presentation, vital signs were stable, O2 sat 99% on room air, slightly tachypneic. Labs notable for D-dimer of 2282, troponin 95>.84, creatinine 2.4, BNP 10,156. The patient received Lasix and 500 cc bolus in the ED.  He reports he was following with Dr. Padilla Ávlarez at [facility] [place], last seen in 1/2025.  Had been doing well on Entresto and Carvedilol regimen, however, Entresto was not covered by insurance this year.     Reviewed cardiology notes on patient's EPIC MyChart portal.  Last visit 1/10/2025 meds list Entresto  mg BID, Carvedilol 12.5 mg BID, Torsemide 20 mg daily.  LVEF on echo in 2/2024 33%, echo 3/2023: Moderately dilated LV with severely reduced systolic function, LVEF 28%, normal RV size with mildly reduced systolic function.  Patient declined ICD in the past.   Normal coronaries in 2006      Allergies    No Known Allergies    Intolerances    	    MEDICATIONS:  carvedilol 12.5 milliGRAM(s) Oral every 12 hours  furosemide   Injectable 40 milliGRAM(s) IV Push every 12 hours  heparin   Injectable 5000 Unit(s) SubCutaneous every 12 hours  spironolactone 25 milliGRAM(s) Oral daily        acetaminophen     Tablet .. 650 milliGRAM(s) Oral every 6 hours PRN      atorvastatin 80 milliGRAM(s) Oral at bedtime  dextrose 50% Injectable 25 Gram(s) IV Push once  dextrose 50% Injectable 12.5 Gram(s) IV Push once  dextrose 50% Injectable 25 Gram(s) IV Push once  dextrose Oral Gel 15 Gram(s) Oral once PRN  finasteride 5 milliGRAM(s) Oral daily  glucagon  Injectable 1 milliGRAM(s) IntraMuscular once  insulin lispro (ADMELOG) corrective regimen sliding scale   SubCutaneous three times a day before meals    dextrose 5%. 1000 milliLiter(s) IV Continuous <Continuous>  dextrose 5%. 1000 milliLiter(s) IV Continuous <Continuous>  tamsulosin 0.4 milliGRAM(s) Oral at bedtime      PAST MEDICAL & SURGICAL HISTORY:  HTN (hypertension)      HLD (hyperlipidemia)      BPH (benign prostatic hyperplasia)      Acute on chronic systolic congestive heart failure      DM (diabetes mellitus)          FAMILY HISTORY:  non-contributory      SOCIAL HISTORY:    x] Non-smoker     REVIEW OF SYSTEMS:  General: no fatigue/malaise, weight loss/gain.  Skin: no rashes.  Ophthalmologic: no blurred vision, no loss of vision. 	  ENT: no sore throat, rhinorrhea, sinus congestion.  Respiratory: no SOB, cough or wheeze.  Gastrointestinal:  no N/V/D, no melena/hematemesis/hematochezia.  Genitourinary: no dysuria/hesitancy or hematuria.  Musculoskeletal: no myalgias or arthralgias.  Neurological: no changes in vision or hearing, no lightheadedness/dizziness, no syncope/near syncope	  Psychiatric: no unusual stress/anxiety.   Hematology/Lymphatics: no unusual bleeding, bruising and no lymphadenopathy.  Endocrine: no unusual thirst.   All others negative except as stated above and in HPI.    PHYSICAL EXAM:  T(C): 36.4 (07-03-25 @ 15:44), Max: 36.8 (07-03-25 @ 09:11)  HR: 83 (07-03-25 @ 15:44) (80 - 93)  BP: 98/68 (07-03-25 @ 15:44) (98/68 - 119/80)  RR: 18 (07-03-25 @ 15:44) (14 - 20)  SpO2: 99% (07-03-25 @ 15:44) (96% - 100%)  Wt(kg): --  I&O's Summary      Appearance: Normal	  HEENT:   Normal oral mucosa, PERRL, EOMI	  Lymphatic: No lymphadenopathy  Cardiovascular: RRR, Normal S1 S2, No JVD, No murmurs, No edema  Respiratory: Decreased BS at bases  Psychiatry: A & O x 3, Mood & affect appropriate  Gastrointestinal:  Soft, Non-tender, + BS	  Skin: No rashes, No ecchymoses, No cyanosis	  Neurologic: Non-focal  Extremities: Normal range of motion, No clubbing, cyanosis or edema  Vascular: Peripheral pulses palpable 2+ bilaterally        LABS:	 	    CBC Full  -  ( 03 Jul 2025 10:10 )  WBC Count : 7.17 K/uL  Hemoglobin : 13.9 g/dL  Hematocrit : 43.7 %  Platelet Count - Automated : 230 K/uL  Mean Cell Volume : 93.8 fl  Mean Cell Hemoglobin : 29.8 pg  Mean Cell Hemoglobin Concentration : 31.8 g/dL  Auto Neutrophil # : x  Auto Lymphocyte # : x  Auto Monocyte # : x  Auto Eosinophil # : x  Auto Basophil # : x  Auto Neutrophil % : x  Auto Lymphocyte % : x  Auto Monocyte % : x  Auto Eosinophil % : x  Auto Basophil % : x    07-03    137  |  105  |  50[H]  ----------------------------<  241[H]  4.8   |  22  |  2.18[H]  07-03    135  |  104  |  48[H]  ----------------------------<  249[H]  4.7   |  21[L]  |  2.42[H]    Ca    9.9      03 Jul 2025 10:10  Ca    9.8      03 Jul 2025 01:17    TPro  7.6  /  Alb  3.6  /  TBili  1.0  /  DBili  x   /  AST  15  /  ALT  26  /  AlkPhos  116  07-03  TPro  7.8  /  Alb  3.7  /  TBili  1.1  /  DBili  x   /  AST  17  /  ALT  28  /  AlkPhos  127[H]  07-03      proBNP: 82161  D-dimer 2282    TELEMETRY: SR, ST, PAC's, atrial couplets, PVC's	    ECG:  	  RADIOLOGY:  V/Q scan   IMPRESSION: Very low probability of pulmonary embolus.  OTHER: 	      ASSESSMENT/PLAN:

## 2025-07-03 NOTE — ED PROVIDER NOTE - CLINICAL SUMMARY MEDICAL DECISION MAKING FREE TEXT BOX
This patient is a 74 year old ma hx of CHF, DM, and HTN who presents to the ER c/o SOB x 6 days.  The SOB occurs mostly with exertion and has become significantly worse over the past day.  He states that he feels that the heat is making his symptoms worse.  Patient denies chest pain, fever, cough, and leg swelling.  Patient reports his last stress test might have been in 2022.  As per EMR patient had low EF in July 2024.    Patient well appearing, no acute distress, vitals stable, no peripheral edema however rales noted in left lower lung fields.  No murmurs, abdomen soft, non-tender,  DDx includes but not limited to ACS, CHF exacerbation, pleural effusion, PE.  Lower suspicion for PNA.  EKG non-ischemic.  Will get CXR, check labs and likely admit as patient will need another ECHO.      Creatinine elevated.  Troponin and d-dimer elevated.  Unable to get CTA at this time with elevated creatine.  Patient will need likely VQ scan as inpatient.  Patient admitted to telemetry.

## 2025-07-03 NOTE — H&P ADULT - PROBLEM SELECTOR PLAN 5
- Home meds: jardiance and glipizide  - start  ISS, POC BG achs>> calculate basal bolus based on need  - hypoglycemia protocol in place  - carb control diet  - f/u A1c

## 2025-07-03 NOTE — ED PROVIDER NOTE - CARDIAC, MLM
Normal rate, regular rhythm.  Heart sounds S1, S2.  No murmurs, rubs or gallops.
13 y/o with hives from unknown allergen, no respiratory distress, no vomiting, responded well to benadryl. Recommended follow up with allergist. Return precautions discussed with parent. Ella TRUJILLO

## 2025-07-03 NOTE — H&P ADULT - PROBLEM SELECTOR PLAN 6
- pt denies hx (poor historian)  - documented in [facility] ?with bone mets  - obtain collateral info in am

## 2025-07-03 NOTE — H&P ADULT - ASSESSMENT
74-year-old male with past medical history of systolic CHF(last known EF 20%), hypertension, hyperlipidemia, type 2 diabetes, and BPH, ?prostate ca who presented to the ED due to shortness of breath for the last 5days. Found to be in CHF exacerbation, r/o PE. Admit to telemetry.

## 2025-07-03 NOTE — CONSULT NOTE ADULT - ASSESSMENT
ASSESSMENT/PLAN:   1. HFrEF  Continue Furosemide 40 mg IV BID  Continue carvedilol 12.5 mg BID  Not candidate for ARB/ARNI at this time due to worsening renal function.   Wishes evaluation for ICD - offered referral to [facility].     2. Markedly elevated d-dimer  Unclear significance  V/q scan negative  Consider venous US to r/o DVT.     Thank you for the courtesy of this consult.   Cardiology team to follow.

## 2025-07-03 NOTE — PATIENT PROFILE ADULT - FALL HARM RISK - HARM RISK INTERVENTIONS

## 2025-07-03 NOTE — H&P ADULT - NSHPPHYSICALEXAM_GEN_ALL_CORE
GENERAL: NAD, lying in bed comfortably  HEAD:  Atraumatic, normocephalic  EYES: EOMI, PERRL  NECK: Supple, trachea midline  HEART: Regular rate and rhythm  LUNGS: labored respirations. Clear to auscultation bilaterally, no crackles, wheezing, or rhonchi  ABDOMEN: Soft, nontender, nondistended, +BS  EXTREMITIES: 2+ peripheral pulses bilaterally. No clubbing, cyanosis, or edema  NERVOUS SYSTEM:  A&Ox3, moving all extremities, no focal deficits

## 2025-07-03 NOTE — H&P ADULT - NSHPLABSRESULTS_GEN_ALL_CORE
LABS:  cret                        14.2   7.94  )-----------( 264      ( 03 Jul 2025 01:17 )             43.5     07-03    135  |  104  |  48[H]  ----------------------------<  249[H]  4.7   |  21[L]  |  2.42[H]    Ca    9.8      03 Jul 2025 01:17    TPro  7.8  /  Alb  3.7  /  TBili  1.1  /  DBili  x   /  AST  17  /  ALT  28  /  AlkPhos  127[H]  07-03    < from: Xray Chest 1 View- PORTABLE-Urgent (07.03.25 @ 02:23) >      IMPRESSION:   No radiographic evidence of active chest disease..    < end of copied text >

## 2025-07-03 NOTE — CHART NOTE - NSCHARTNOTEFT_GEN_A_CORE
74-year-old male     h/o  c/c  systolic CHF(  EF 20 ), hypertension, hyperlipidemia,   DM,  and BPH, ? prostate ca     who presented to the ED due to shortness of breath for the last 5days.        sob,  Acute on c/c systolic congestive heart failure.     on iv lasix     card  d r matatev    Cardiomyopathy ,  ef  20, mod  MR     spironolactone, carvedilol, Entresto, Jardiance,      on home torsemide .  h/o  non complance    Troponin   elevated,  from  donny,  demand  ischemia,  not  form  mi    HTN/  HLD  DM,     Home meds: jardiance and glipizide     h/o Prostate CA. ,  documented in [facility] ?with bone mets/t2  VERTEBRA  dvt  ppx       rad< from: TTE Echo Complete w/ Contrast w/ Doppler (07.10.24 @ 10:43) >  CONCLUSIONS:   1. Left ventricular cavity is moderately dilated. Left ventricular   systolic function is severely decreased with an ejection fraction   visually estimated at <20 %.   2. There is moderate (grade 2) left ventricular diastolic dysfunction.   3. There is no clear evidence of a left ventricular thrombus.   4. The left atrium is moderately dilated.   5. The right atrium is mildly dilated.   6. Normal right ventricular systolic function.   7. Moderate mitral regurgitation.   8. Trileaflet aortic valve with normal systolic excursion.   9. Trace tricuspid regurgitation.  10. Mild to moderate pulmonic regurgitation.  11. No pericardial effusion seen._  < end of copied text >           RA 74-year-old male     h/o  c/c  systolic CHF(  EF 20 ), hypertension, hyperlipidemia,   DM,  and BPH, ? prostate ca     who presented to the ED due to shortness of breath for the last 5days.        sob,  Acute on c/c systolic congestive heart failure.     on iv lasix     card  dr reilly    Cardiomyopathy ,  ef  20, mod  MR     spironolactone, carvedilol, Entresto, Jardiance,      on home torsemide .  h/o  non complance    Troponin   elevated,  from  donny,  demand  ischemia,  not  form  mi     AICD    was  discussed  with  pt/  pt  acknowledges  that  he  was  offered  aicd ,a nd this can be  arrange d by  card  as  an out  pt  basis,m  when  stable   HTN/  HLD  DM,     Home meds: jardiance and glipizide     h/o Prostate CA. ,  documented in [facility] HIE /  T  spine lesion, not  mets, on his PET scan/  f/p  [facility] onc  d r villarreal/  [facility]  dvt  ppx       rad< from: TTE Echo Complete w/ Contrast w/ Doppler (07.10.24 @ 10:43) >  CONCLUSIONS:   1. Left ventricular cavity is moderately dilated. Left ventricular   systolic function is severely decreased with an ejection fraction   visually estimated at <20 %.   2. There is moderate (grade 2) left ventricular diastolic dysfunction.   3. There is no clear evidence of a left ventricular thrombus.   4. The left atrium is moderately dilated.   5. The right atrium is mildly dilated.   6. Normal right ventricular systolic function.   7. Moderate mitral regurgitation.   8. Trileaflet aortic valve with normal systolic excursion.   9. Trace tricuspid regurgitation.  10. Mild to moderate pulmonic regurgitation.  11. No pericardial effusion seen._  < end of copied text >           RA 74-year-old male     h/o  c/c  systolic CHF(  EF 20 ), hypertension, hyperlipidemia,   DM,  and BPH, ? prostate ca     who presented to the ED due to shortness of breath for the last 5days.        sob,  Acute on c/c systolic congestive heart failure.     on iv lasix     card  dr reilly called    Cardiomyopathy ,  ef  20, mod  MR     spironolactone, carvedilol, Entresto, Jardiance,      on home torsemide .  h/o  non complance    Troponin   elevated,  from  donny,  demand  ischemia,  not  form  mi     AICD    was  discussed  with  pt/  pt  acknowledges  that  he  was  offered  AICD, a nd this can be  arranged by  card  as  an out  pt  basis ,m  when  stable   HTN/  HLD  DM,     Home meds: jardiance and glipizide     h/o Prostate CA. ,  noted [facility] HIE /  T  spine lesion, not  mets, on his PET scan/  f/p  with his  onc  figueroa villarreal/  [facility]  dvt  ppx       rad< from: TTE Echo Complete w/ Contrast w/ Doppler (07.10.24 @ 10:43) >  CONCLUSIONS:   1. Left ventricular cavity is moderately dilated. Left ventricular   systolic function is severely decreased with an ejection fraction   visually estimated at <20 %.   2. There is moderate (grade 2) left ventricular diastolic dysfunction.   3. There is no clear evidence of a left ventricular thrombus.   4. The left atrium is moderately dilated.   5. The right atrium is mildly dilated.   6. Normal right ventricular systolic function.   7. Moderate mitral regurgitation.   8. Trileaflet aortic valve with normal systolic excursion.   9. Trace tricuspid regurgitation.  10. Mild to moderate pulmonic regurgitation.  11. No pericardial effusion seen._  < end of copied text >           RA

## 2025-07-03 NOTE — H&P ADULT - PROBLEM SELECTOR PLAN 1
- p/w tachypnea  - Likely due to acute on chronic CHF exacerbation  - Last known EF(2024) 20%; pt states he has declined AICD   - Currently on room air  - home meds: spironolactone, carvedilol, Entresto, Jardiance, torsemide   - likely 2/2 CHF exacerbation  - c/w lasix 40 mg iv BID  - strict i/os  - daily weights

## 2025-07-03 NOTE — ED PROVIDER NOTE - OBJECTIVE STATEMENT
This patient is a 74 year old ma hx of CHF, DM, and HTN who presents to the ER c/o SOB x 6 days.  The SOB occurs mostly with exertion and has become significantly worse over the past day.  He states that he feels that the heat is making his symptoms worse.  Patient denies chest pain, fever, cough, and leg swelling.  Patient reports his last stress test might have been in 2022.  As per EMR patient had low EF in July 2024.

## 2025-07-03 NOTE — ED ADULT NURSE NOTE - NSFALLHARMRISKINTERV_ED_ALL_ED

## 2025-07-04 LAB
A1C WITH ESTIMATED AVERAGE GLUCOSE RESULT: 10.8 % — HIGH (ref 4–5.6)
ANION GAP SERPL CALC-SCNC: 14 MMOL/L — SIGNIFICANT CHANGE UP (ref 5–17)
BUN SERPL-MCNC: 42 MG/DL — HIGH (ref 7–23)
CALCIUM SERPL-MCNC: 9.2 MG/DL — SIGNIFICANT CHANGE UP (ref 8.5–10.1)
CHLORIDE SERPL-SCNC: 103 MMOL/L — SIGNIFICANT CHANGE UP (ref 96–108)
CO2 SERPL-SCNC: 19 MMOL/L — LOW (ref 22–31)
CREAT SERPL-MCNC: 1.57 MG/DL — HIGH (ref 0.5–1.3)
EGFR: 46 ML/MIN/1.73M2 — LOW
EGFR: 46 ML/MIN/1.73M2 — LOW
ESTIMATED AVERAGE GLUCOSE: 263 MG/DL — HIGH (ref 68–114)
GLUCOSE BLDC GLUCOMTR-MCNC: 191 MG/DL — HIGH (ref 70–99)
GLUCOSE BLDC GLUCOMTR-MCNC: 242 MG/DL — HIGH (ref 70–99)
GLUCOSE BLDC GLUCOMTR-MCNC: 249 MG/DL — HIGH (ref 70–99)
GLUCOSE SERPL-MCNC: 189 MG/DL — HIGH (ref 70–99)
POTASSIUM SERPL-MCNC: 3.3 MMOL/L — LOW (ref 3.5–5.3)
POTASSIUM SERPL-SCNC: 3.3 MMOL/L — LOW (ref 3.5–5.3)
SODIUM SERPL-SCNC: 136 MMOL/L — SIGNIFICANT CHANGE UP (ref 135–145)

## 2025-07-04 PROCEDURE — 99233 SBSQ HOSP IP/OBS HIGH 50: CPT

## 2025-07-04 RX ADMIN — TAMSULOSIN HYDROCHLORIDE 0.4 MILLIGRAM(S): 0.4 CAPSULE ORAL at 22:10

## 2025-07-04 RX ADMIN — HEPARIN SODIUM 5000 UNIT(S): 1000 INJECTION INTRAVENOUS; SUBCUTANEOUS at 17:02

## 2025-07-04 RX ADMIN — ATORVASTATIN CALCIUM 80 MILLIGRAM(S): 80 TABLET, FILM COATED ORAL at 22:10

## 2025-07-04 RX ADMIN — FINASTERIDE 5 MILLIGRAM(S): 1 TABLET, FILM COATED ORAL at 11:30

## 2025-07-04 RX ADMIN — HEPARIN SODIUM 5000 UNIT(S): 1000 INJECTION INTRAVENOUS; SUBCUTANEOUS at 05:17

## 2025-07-04 RX ADMIN — INSULIN LISPRO 1: 100 INJECTION, SOLUTION INTRAVENOUS; SUBCUTANEOUS at 08:19

## 2025-07-04 RX ADMIN — Medication 25 MILLIGRAM(S): at 05:27

## 2025-07-04 RX ADMIN — FUROSEMIDE 40 MILLIGRAM(S): 10 INJECTION INTRAMUSCULAR; INTRAVENOUS at 05:17

## 2025-07-04 RX ADMIN — INSULIN LISPRO 2: 100 INJECTION, SOLUTION INTRAVENOUS; SUBCUTANEOUS at 16:59

## 2025-07-04 RX ADMIN — INSULIN LISPRO 2: 100 INJECTION, SOLUTION INTRAVENOUS; SUBCUTANEOUS at 11:41

## 2025-07-04 RX ADMIN — CARVEDILOL 12.5 MILLIGRAM(S): 3.12 TABLET, FILM COATED ORAL at 08:24

## 2025-07-04 RX ADMIN — Medication 40 MILLIEQUIVALENT(S): at 11:30

## 2025-07-04 NOTE — CONSULT NOTE ADULT - SUBJECTIVE AND OBJECTIVE BOX
Patient chart reviewed, full consult to follow.     Thank you for the courtesy of this consultation. [facility] NEPHROLOGY SERVICES, [facility]  NEPHROLOGY AND HYPERTENSION  [address]  [address]  [place] [address]  [phone]    MD ADRIANNA URIOSTEGUI MD YELENA ROSENBERG, MD BINNY KOSHY, MD CHRISTOPHER CAPUTO, MD EDWARD BOVER, MD      Information from chart:  "Patient is a 74y old  Male who presents with a chief complaint of RIVAS likely due to CHF exacerbation (2025 15:49)    HPI:  74-year-old male with past medical history of systolic CHF(last known EF 20%), hypertension, hyperlipidemia, type 2 diabetes, and BPH, ?prostate ca who presented to the ED due to shortness of breath for the last 5days. The patient lives in the [place] but son-in-law is a respiratory therapist in this hospital so decided to bring him in. Patient reports that it took him 20 minutes to get to the corner store( which usually takes five minutes) due to shortness of breath. States SOB is only with exertion. + orthopnea. The patient denies any fevers, chills, coughing, chest pain, GI/ symptoms, or other complaints.   On presentation, vital signs were stable, O2 sat 99% on room air, slightly tachypneic. Labs notable for D-dimer of 2282, troponin 95>.84, creatinine 2.4, BNP 10,156. The patient received Lasix and 500 cc bolus in the ED.  (2025 08:25)   "    PAST MEDICAL & SURGICAL HISTORY:  HTN (hypertension)      HLD (hyperlipidemia)      BPH (benign prostatic hyperplasia)      Acute on chronic systolic congestive heart failure      DM (diabetes mellitus)        FAMILY HISTORY:    Allergies    No Known Allergies    Intolerances      Home Medications:  allopurinol 300 mg oral tablet: 1 tab(s) orally once a day (2025 01:00)  atorvastatin 80 mg oral tablet: 1 tab(s) orally once a day (at bedtime) (2025 01:00)  CARVEDILOL 12.5 MG TABLET: TAKE 0.5 TABLETS (6.25 MG TOTAL) BY MOUTH 2 (TWO) TIMES A DAY WITH MEALS IN THE MORNING AND EVENING (2025 06:20)  empagliflozin 10 mg oral tablet: 1 tab(s) orally once a day (2025 01:00)  finasteride 5 mg oral tablet: 1 tab(s) orally once a day (2024 12:41)  glipiZIDE 10 mg oral tablet, extended release: 1 tab(s) orally once a day (10 Jul 2024 03:45)  metFORMIN 1000 mg oral tablet: 1 tab(s) orally 2 times a day (10 Jul 2024 03:45)  sacubitril-valsartan 97 mg-103 mg oral tablet: 1 tab(s) orally 2 times a day (2024 12:41)  spironolactone 25 mg oral tablet: 1 tab(s) orally once a day (2024 12:41)  tamsulosin 0.4 mg oral capsule: 1 cap(s) orally once a day (at bedtime) (2024 12:41)  torsemide 20 mg oral tablet: 1 tab(s) orally once a day (10 Jul 2024 03:45)  traMADol 50 mg oral tablet: 1 tab(s) orally every 6 hours as needed for  severe pain (10 Jul 2024 03:45)    MEDICATIONS  (STANDING):  atorvastatin 80 milliGRAM(s) Oral at bedtime  carvedilol 12.5 milliGRAM(s) Oral every 12 hours  dextrose 5%. 1000 milliLiter(s) (50 mL/Hr) IV Continuous <Continuous>  dextrose 5%. 1000 milliLiter(s) (100 mL/Hr) IV Continuous <Continuous>  dextrose 50% Injectable 25 Gram(s) IV Push once  dextrose 50% Injectable 12.5 Gram(s) IV Push once  dextrose 50% Injectable 25 Gram(s) IV Push once  finasteride 5 milliGRAM(s) Oral daily  furosemide   Injectable 40 milliGRAM(s) IV Push every 12 hours  glucagon  Injectable 1 milliGRAM(s) IntraMuscular once  heparin   Injectable 5000 Unit(s) SubCutaneous every 12 hours  insulin lispro (ADMELOG) corrective regimen sliding scale   SubCutaneous three times a day before meals  spironolactone 25 milliGRAM(s) Oral daily  tamsulosin 0.4 milliGRAM(s) Oral at bedtime    MEDICATIONS  (PRN):  acetaminophen     Tablet .. 650 milliGRAM(s) Oral every 6 hours PRN Temp greater or equal to 38C (100.4F), Mild Pain (1 - 3)  dextrose Oral Gel 15 Gram(s) Oral once PRN Blood Glucose LESS THAN 70 milliGRAM(s)/deciliter    Vital Signs Last 24 Hrs  T(C): 36.6 (2025 16:00), Max: 36.6 (2025 11:38)  T(F): 97.8 (2025 16:00), Max: 97.9 (2025 11:38)  HR: 86 (2025 16:00) (73 - 89)  BP: 97/68 (2025 16:00) (85/60 - 107/77)  BP(mean): 74 (2025 13:28) (74 - 87)  RR: 19 (2025 16:00) (18 - 19)  SpO2: 94% (2025 16:00) (92% - 99%)        Daily     Daily Weight in k.2 (2025 04:36)    25 @ 07:01  -  25 @ 07:00  --------------------------------------------------------  IN: 150 mL / OUT: 0 mL / NET: 150 mL    25 @ 07:01  -  25 @ 23:43  --------------------------------------------------------  IN: 480 mL / OUT: 0 mL / NET: 480 mL      CAPILLARY BLOOD GLUCOSE      POCT Blood Glucose.: 242 mg/dL (2025 16:35)  POCT Blood Glucose.: 249 mg/dL (2025 11:01)  POCT Blood Glucose.: 191 mg/dL (2025 08:09)    PHYSICAL EXAM:      T(C): 36.6 (25 @ 16:00), Max: 36.6 (25 @ 11:38)  HR: 86 (25 @ 16:00) (73 - 89)  BP: 97/68 (25 @ 16:00) (85/60 - 107/77)  RR: 19 (25 @ 16:00) (18 - 19)  SpO2: 94% (25 @ 16:00) (92% - 99%)  Wt(kg): --  Lungs clear  Heart S1S2  Abd soft NT ND  Extremities:   tr edema                  136  |  103  |  42[H]  ----------------------------<  189[H]  3.3[L]   |  19[L]  |  1.57[H]    Ca    9.2      2025 06:47    TPro  7.6  /  Alb  3.6  /  TBili  1.0  /  DBili  x   /  AST  15  /  ALT  26  /  AlkPhos  116                            13.9   7.17  )-----------( 230      ( 2025 10:10 )             43.7     Creatinine Trend: 1.57<--, 2.18<--, 2.42<--  Urinalysis Basic - ( 2025 06:47 )    Color: x / Appearance: x / SG: x / pH: x  Gluc: 189 mg/dL / Ketone: x  / Bili: x / Urobili: x   Blood: x / Protein: x / Nitrite: x   Leuk Esterase: x / RBC: x / WBC x   Sq Epi: x / Non Sq Epi: x / Bacteria: x            Assessment   ZAHEER CKD 3; CRS; low EF  Elevated D dimers; Low prob v/q    Plan  Follow LE duplex  Maintenance diuretics   Discussed with family     Michael Mckeon MD        Patient chart reviewed, full consult to follow.     Thank you for the courtesy of this consultation.

## 2025-07-05 LAB
ALBUMIN SERPL ELPH-MCNC: 3.5 G/DL — SIGNIFICANT CHANGE UP (ref 3.3–5)
ALP SERPL-CCNC: 103 U/L — SIGNIFICANT CHANGE UP (ref 40–120)
ALT FLD-CCNC: 24 U/L — SIGNIFICANT CHANGE UP (ref 12–78)
ANION GAP SERPL CALC-SCNC: 8 MMOL/L — SIGNIFICANT CHANGE UP (ref 5–17)
AST SERPL-CCNC: 14 U/L — LOW (ref 15–37)
BILIRUB DIRECT SERPL-MCNC: 0.2 MG/DL — SIGNIFICANT CHANGE UP (ref 0–0.3)
BILIRUB INDIRECT FLD-MCNC: 0.7 MG/DL — SIGNIFICANT CHANGE UP (ref 0.2–1)
BILIRUB SERPL-MCNC: 0.9 MG/DL — SIGNIFICANT CHANGE UP (ref 0.2–1.2)
BUN SERPL-MCNC: 42 MG/DL — HIGH (ref 7–23)
CALCIUM SERPL-MCNC: 10 MG/DL — SIGNIFICANT CHANGE UP (ref 8.5–10.1)
CHLORIDE SERPL-SCNC: 105 MMOL/L — SIGNIFICANT CHANGE UP (ref 96–108)
CO2 SERPL-SCNC: 24 MMOL/L — SIGNIFICANT CHANGE UP (ref 22–31)
CREAT SERPL-MCNC: 1.52 MG/DL — HIGH (ref 0.5–1.3)
EGFR: 48 ML/MIN/1.73M2 — LOW
EGFR: 48 ML/MIN/1.73M2 — LOW
GLUCOSE BLDC GLUCOMTR-MCNC: 164 MG/DL — HIGH (ref 70–99)
GLUCOSE BLDC GLUCOMTR-MCNC: 210 MG/DL — HIGH (ref 70–99)
GLUCOSE BLDC GLUCOMTR-MCNC: 252 MG/DL — HIGH (ref 70–99)
GLUCOSE BLDC GLUCOMTR-MCNC: 253 MG/DL — HIGH (ref 70–99)
GLUCOSE SERPL-MCNC: 206 MG/DL — HIGH (ref 70–99)
HCT VFR BLD CALC: 43.2 % — SIGNIFICANT CHANGE UP (ref 39–50)
HGB BLD-MCNC: 13.7 G/DL — SIGNIFICANT CHANGE UP (ref 13–17)
MAGNESIUM SERPL-MCNC: 1.8 MG/DL — SIGNIFICANT CHANGE UP (ref 1.6–2.6)
MCHC RBC-ENTMCNC: 29.6 PG — SIGNIFICANT CHANGE UP (ref 27–34)
MCHC RBC-ENTMCNC: 31.7 G/DL — LOW (ref 32–36)
MCV RBC AUTO: 93.3 FL — SIGNIFICANT CHANGE UP (ref 80–100)
NRBC BLD AUTO-RTO: 0 /100 WBCS — SIGNIFICANT CHANGE UP (ref 0–0)
PHOSPHATE SERPL-MCNC: 3.7 MG/DL — SIGNIFICANT CHANGE UP (ref 2.5–4.5)
PLATELET # BLD AUTO: 227 K/UL — SIGNIFICANT CHANGE UP (ref 150–400)
POTASSIUM SERPL-MCNC: 4 MMOL/L — SIGNIFICANT CHANGE UP (ref 3.5–5.3)
POTASSIUM SERPL-SCNC: 4 MMOL/L — SIGNIFICANT CHANGE UP (ref 3.5–5.3)
PROT SERPL-MCNC: 7.2 GM/DL — SIGNIFICANT CHANGE UP (ref 6–8.3)
RBC # BLD: 4.63 M/UL — SIGNIFICANT CHANGE UP (ref 4.2–5.8)
RBC # FLD: 16.6 % — HIGH (ref 10.3–14.5)
SODIUM SERPL-SCNC: 137 MMOL/L — SIGNIFICANT CHANGE UP (ref 135–145)
WBC # BLD: 6.7 K/UL — SIGNIFICANT CHANGE UP (ref 3.8–10.5)
WBC # FLD AUTO: 6.7 K/UL — SIGNIFICANT CHANGE UP (ref 3.8–10.5)

## 2025-07-05 PROCEDURE — 99232 SBSQ HOSP IP/OBS MODERATE 35: CPT

## 2025-07-05 PROCEDURE — 93970 EXTREMITY STUDY: CPT | Mod: 26

## 2025-07-05 RX ADMIN — INSULIN LISPRO 3: 100 INJECTION, SOLUTION INTRAVENOUS; SUBCUTANEOUS at 17:35

## 2025-07-05 RX ADMIN — FINASTERIDE 5 MILLIGRAM(S): 1 TABLET, FILM COATED ORAL at 12:09

## 2025-07-05 RX ADMIN — Medication 25 MILLIGRAM(S): at 06:14

## 2025-07-05 RX ADMIN — TAMSULOSIN HYDROCHLORIDE 0.4 MILLIGRAM(S): 0.4 CAPSULE ORAL at 21:48

## 2025-07-05 RX ADMIN — CARVEDILOL 12.5 MILLIGRAM(S): 3.12 TABLET, FILM COATED ORAL at 06:14

## 2025-07-05 RX ADMIN — ATORVASTATIN CALCIUM 80 MILLIGRAM(S): 80 TABLET, FILM COATED ORAL at 21:48

## 2025-07-05 RX ADMIN — INSULIN LISPRO 3: 100 INJECTION, SOLUTION INTRAVENOUS; SUBCUTANEOUS at 12:09

## 2025-07-05 RX ADMIN — FUROSEMIDE 40 MILLIGRAM(S): 10 INJECTION INTRAMUSCULAR; INTRAVENOUS at 06:14

## 2025-07-05 RX ADMIN — HEPARIN SODIUM 5000 UNIT(S): 1000 INJECTION INTRAVENOUS; SUBCUTANEOUS at 17:36

## 2025-07-05 RX ADMIN — INSULIN LISPRO 2: 100 INJECTION, SOLUTION INTRAVENOUS; SUBCUTANEOUS at 08:23

## 2025-07-05 RX ADMIN — HEPARIN SODIUM 5000 UNIT(S): 1000 INJECTION INTRAVENOUS; SUBCUTANEOUS at 06:14

## 2025-07-06 LAB
GLUCOSE BLDC GLUCOMTR-MCNC: 223 MG/DL — HIGH (ref 70–99)
GLUCOSE BLDC GLUCOMTR-MCNC: 237 MG/DL — HIGH (ref 70–99)
GLUCOSE BLDC GLUCOMTR-MCNC: 245 MG/DL — HIGH (ref 70–99)
GLUCOSE BLDC GLUCOMTR-MCNC: 251 MG/DL — HIGH (ref 70–99)

## 2025-07-06 PROCEDURE — 99232 SBSQ HOSP IP/OBS MODERATE 35: CPT

## 2025-07-06 RX ORDER — INSULIN GLARGINE-YFGN 100 [IU]/ML
10 INJECTION, SOLUTION SUBCUTANEOUS AT BEDTIME
Refills: 0 | Status: DISCONTINUED | OUTPATIENT
Start: 2025-07-06 | End: 2025-07-07

## 2025-07-06 RX ADMIN — TAMSULOSIN HYDROCHLORIDE 0.4 MILLIGRAM(S): 0.4 CAPSULE ORAL at 22:12

## 2025-07-06 RX ADMIN — HEPARIN SODIUM 5000 UNIT(S): 1000 INJECTION INTRAVENOUS; SUBCUTANEOUS at 05:17

## 2025-07-06 RX ADMIN — INSULIN LISPRO 2: 100 INJECTION, SOLUTION INTRAVENOUS; SUBCUTANEOUS at 17:19

## 2025-07-06 RX ADMIN — ATORVASTATIN CALCIUM 80 MILLIGRAM(S): 80 TABLET, FILM COATED ORAL at 22:12

## 2025-07-06 RX ADMIN — INSULIN LISPRO 3: 100 INJECTION, SOLUTION INTRAVENOUS; SUBCUTANEOUS at 11:56

## 2025-07-06 RX ADMIN — FUROSEMIDE 40 MILLIGRAM(S): 10 INJECTION INTRAMUSCULAR; INTRAVENOUS at 05:15

## 2025-07-06 RX ADMIN — Medication 25 MILLIGRAM(S): at 05:18

## 2025-07-06 RX ADMIN — FINASTERIDE 5 MILLIGRAM(S): 1 TABLET, FILM COATED ORAL at 11:56

## 2025-07-06 RX ADMIN — HEPARIN SODIUM 5000 UNIT(S): 1000 INJECTION INTRAVENOUS; SUBCUTANEOUS at 17:18

## 2025-07-06 RX ADMIN — CARVEDILOL 12.5 MILLIGRAM(S): 3.12 TABLET, FILM COATED ORAL at 05:19

## 2025-07-06 RX ADMIN — INSULIN LISPRO 2: 100 INJECTION, SOLUTION INTRAVENOUS; SUBCUTANEOUS at 07:39

## 2025-07-06 RX ADMIN — INSULIN GLARGINE-YFGN 10 UNIT(S): 100 INJECTION, SOLUTION SUBCUTANEOUS at 22:12

## 2025-07-07 ENCOUNTER — RESULT REVIEW (OUTPATIENT)
Age: 75
End: 2025-07-07

## 2025-07-07 LAB
ANION GAP SERPL CALC-SCNC: 10 MMOL/L — SIGNIFICANT CHANGE UP (ref 5–17)
APTT BLD: >200 SEC — CRITICAL HIGH (ref 26.1–36.8)
APTT BLD: >200 SEC — CRITICAL HIGH (ref 26.1–36.8)
BUN SERPL-MCNC: 40 MG/DL — HIGH (ref 7–23)
CALCIUM SERPL-MCNC: 10 MG/DL — SIGNIFICANT CHANGE UP (ref 8.5–10.1)
CHLORIDE SERPL-SCNC: 102 MMOL/L — SIGNIFICANT CHANGE UP (ref 96–108)
CO2 SERPL-SCNC: 25 MMOL/L — SIGNIFICANT CHANGE UP (ref 22–31)
CREAT SERPL-MCNC: 1.67 MG/DL — HIGH (ref 0.5–1.3)
EGFR: 43 ML/MIN/1.73M2 — LOW
EGFR: 43 ML/MIN/1.73M2 — LOW
GLUCOSE BLDC GLUCOMTR-MCNC: 174 MG/DL — HIGH (ref 70–99)
GLUCOSE BLDC GLUCOMTR-MCNC: 211 MG/DL — HIGH (ref 70–99)
GLUCOSE BLDC GLUCOMTR-MCNC: 225 MG/DL — HIGH (ref 70–99)
GLUCOSE BLDC GLUCOMTR-MCNC: 269 MG/DL — HIGH (ref 70–99)
GLUCOSE SERPL-MCNC: 237 MG/DL — HIGH (ref 70–99)
HCT VFR BLD CALC: 42.7 % — SIGNIFICANT CHANGE UP (ref 39–50)
HGB BLD-MCNC: 13.7 G/DL — SIGNIFICANT CHANGE UP (ref 13–17)
INR BLD: 1.28 RATIO — HIGH (ref 0.85–1.16)
MAGNESIUM SERPL-MCNC: 1.7 MG/DL — SIGNIFICANT CHANGE UP (ref 1.6–2.6)
MCHC RBC-ENTMCNC: 29.7 PG — SIGNIFICANT CHANGE UP (ref 27–34)
MCHC RBC-ENTMCNC: 32.1 G/DL — SIGNIFICANT CHANGE UP (ref 32–36)
MCV RBC AUTO: 92.6 FL — SIGNIFICANT CHANGE UP (ref 80–100)
NRBC BLD AUTO-RTO: 0 /100 WBCS — SIGNIFICANT CHANGE UP (ref 0–0)
PHOSPHATE SERPL-MCNC: 4.1 MG/DL — SIGNIFICANT CHANGE UP (ref 2.5–4.5)
PLATELET # BLD AUTO: 232 K/UL — SIGNIFICANT CHANGE UP (ref 150–400)
POTASSIUM SERPL-MCNC: 3.7 MMOL/L — SIGNIFICANT CHANGE UP (ref 3.5–5.3)
POTASSIUM SERPL-SCNC: 3.7 MMOL/L — SIGNIFICANT CHANGE UP (ref 3.5–5.3)
PROTHROM AB SERPL-ACNC: 14.8 SEC — HIGH (ref 9.9–13.4)
RBC # BLD: 4.61 M/UL — SIGNIFICANT CHANGE UP (ref 4.2–5.8)
RBC # FLD: 16.6 % — HIGH (ref 10.3–14.5)
SODIUM SERPL-SCNC: 137 MMOL/L — SIGNIFICANT CHANGE UP (ref 135–145)
WBC # BLD: 5.94 K/UL — SIGNIFICANT CHANGE UP (ref 3.8–10.5)
WBC # FLD AUTO: 5.94 K/UL — SIGNIFICANT CHANGE UP (ref 3.8–10.5)

## 2025-07-07 PROCEDURE — 93356 MYOCRD STRAIN IMG SPCKL TRCK: CPT

## 2025-07-07 PROCEDURE — 93306 TTE W/DOPPLER COMPLETE: CPT | Mod: 26

## 2025-07-07 PROCEDURE — 99233 SBSQ HOSP IP/OBS HIGH 50: CPT

## 2025-07-07 PROCEDURE — 93010 ELECTROCARDIOGRAM REPORT: CPT

## 2025-07-07 RX ORDER — HEPARIN SODIUM 1000 [USP'U]/ML
INJECTION INTRAVENOUS; SUBCUTANEOUS
Qty: 25000 | Refills: 0 | Status: DISCONTINUED | OUTPATIENT
Start: 2025-07-07 | End: 2025-07-07

## 2025-07-07 RX ORDER — HEPARIN SODIUM 1000 [USP'U]/ML
8000 INJECTION INTRAVENOUS; SUBCUTANEOUS EVERY 6 HOURS
Refills: 0 | Status: DISCONTINUED | OUTPATIENT
Start: 2025-07-07 | End: 2025-07-09

## 2025-07-07 RX ORDER — FUROSEMIDE 10 MG/ML
80 INJECTION INTRAMUSCULAR; INTRAVENOUS EVERY 12 HOURS
Refills: 0 | Status: DISCONTINUED | OUTPATIENT
Start: 2025-07-07 | End: 2025-07-11

## 2025-07-07 RX ORDER — HEPARIN SODIUM 1000 [USP'U]/ML
8000 INJECTION INTRAVENOUS; SUBCUTANEOUS ONCE
Refills: 0 | Status: COMPLETED | OUTPATIENT
Start: 2025-07-07 | End: 2025-07-07

## 2025-07-07 RX ORDER — HEPARIN SODIUM 1000 [USP'U]/ML
1500 INJECTION INTRAVENOUS; SUBCUTANEOUS
Qty: 25000 | Refills: 0 | Status: DISCONTINUED | OUTPATIENT
Start: 2025-07-07 | End: 2025-07-09

## 2025-07-07 RX ORDER — INSULIN GLARGINE-YFGN 100 [IU]/ML
15 INJECTION, SOLUTION SUBCUTANEOUS AT BEDTIME
Refills: 0 | Status: DISCONTINUED | OUTPATIENT
Start: 2025-07-07 | End: 2025-07-11

## 2025-07-07 RX ORDER — HEPARIN SODIUM 1000 [USP'U]/ML
4000 INJECTION INTRAVENOUS; SUBCUTANEOUS EVERY 6 HOURS
Refills: 0 | Status: DISCONTINUED | OUTPATIENT
Start: 2025-07-07 | End: 2025-07-09

## 2025-07-07 RX ADMIN — Medication 25 MILLIGRAM(S): at 05:09

## 2025-07-07 RX ADMIN — HEPARIN SODIUM 1500 UNIT(S)/HR: 1000 INJECTION INTRAVENOUS; SUBCUTANEOUS at 22:55

## 2025-07-07 RX ADMIN — INSULIN LISPRO 2: 100 INJECTION, SOLUTION INTRAVENOUS; SUBCUTANEOUS at 08:08

## 2025-07-07 RX ADMIN — HEPARIN SODIUM 5000 UNIT(S): 1000 INJECTION INTRAVENOUS; SUBCUTANEOUS at 18:25

## 2025-07-07 RX ADMIN — HEPARIN SODIUM 0 UNIT(S)/HR: 1000 INJECTION INTRAVENOUS; SUBCUTANEOUS at 20:37

## 2025-07-07 RX ADMIN — TAMSULOSIN HYDROCHLORIDE 0.4 MILLIGRAM(S): 0.4 CAPSULE ORAL at 22:01

## 2025-07-07 RX ADMIN — INSULIN LISPRO 2: 100 INJECTION, SOLUTION INTRAVENOUS; SUBCUTANEOUS at 11:21

## 2025-07-07 RX ADMIN — ATORVASTATIN CALCIUM 80 MILLIGRAM(S): 80 TABLET, FILM COATED ORAL at 22:01

## 2025-07-07 RX ADMIN — HEPARIN SODIUM 5000 UNIT(S): 1000 INJECTION INTRAVENOUS; SUBCUTANEOUS at 05:09

## 2025-07-07 RX ADMIN — HEPARIN SODIUM 1800 UNIT(S)/HR: 1000 INJECTION INTRAVENOUS; SUBCUTANEOUS at 18:59

## 2025-07-07 RX ADMIN — FINASTERIDE 5 MILLIGRAM(S): 1 TABLET, FILM COATED ORAL at 11:21

## 2025-07-07 RX ADMIN — INSULIN GLARGINE-YFGN 15 UNIT(S): 100 INJECTION, SOLUTION SUBCUTANEOUS at 22:01

## 2025-07-07 RX ADMIN — INSULIN LISPRO 3: 100 INJECTION, SOLUTION INTRAVENOUS; SUBCUTANEOUS at 16:49

## 2025-07-07 RX ADMIN — HEPARIN SODIUM 8000 UNIT(S): 1000 INJECTION INTRAVENOUS; SUBCUTANEOUS at 19:00

## 2025-07-07 RX ADMIN — HEPARIN SODIUM 1800 UNIT(S)/HR: 1000 INJECTION INTRAVENOUS; SUBCUTANEOUS at 19:15

## 2025-07-07 RX ADMIN — FUROSEMIDE 40 MILLIGRAM(S): 10 INJECTION INTRAMUSCULAR; INTRAVENOUS at 18:46

## 2025-07-07 RX ADMIN — FUROSEMIDE 40 MILLIGRAM(S): 10 INJECTION INTRAMUSCULAR; INTRAVENOUS at 05:09

## 2025-07-08 LAB
ALBUMIN SERPL ELPH-MCNC: 3.7 G/DL — SIGNIFICANT CHANGE UP (ref 3.3–5)
ALP SERPL-CCNC: 98 U/L — SIGNIFICANT CHANGE UP (ref 40–120)
ALT FLD-CCNC: 27 U/L — SIGNIFICANT CHANGE UP (ref 12–78)
ANION GAP SERPL CALC-SCNC: 7 MMOL/L — SIGNIFICANT CHANGE UP (ref 5–17)
APTT BLD: >200 SEC — CRITICAL HIGH (ref 26.1–36.8)
APTT BLD: >200 SEC — CRITICAL HIGH (ref 26.1–36.8)
AST SERPL-CCNC: 23 U/L — SIGNIFICANT CHANGE UP (ref 15–37)
BILIRUB SERPL-MCNC: 1 MG/DL — SIGNIFICANT CHANGE UP (ref 0.2–1.2)
BUN SERPL-MCNC: 39 MG/DL — HIGH (ref 7–23)
CALCIUM SERPL-MCNC: 9.9 MG/DL — SIGNIFICANT CHANGE UP (ref 8.5–10.1)
CHLORIDE SERPL-SCNC: 103 MMOL/L — SIGNIFICANT CHANGE UP (ref 96–108)
CO2 SERPL-SCNC: 26 MMOL/L — SIGNIFICANT CHANGE UP (ref 22–31)
CREAT SERPL-MCNC: 1.48 MG/DL — HIGH (ref 0.5–1.3)
EGFR: 50 ML/MIN/1.73M2 — LOW
EGFR: 50 ML/MIN/1.73M2 — LOW
GLUCOSE BLDC GLUCOMTR-MCNC: 153 MG/DL — HIGH (ref 70–99)
GLUCOSE BLDC GLUCOMTR-MCNC: 181 MG/DL — HIGH (ref 70–99)
GLUCOSE BLDC GLUCOMTR-MCNC: 217 MG/DL — HIGH (ref 70–99)
GLUCOSE BLDC GLUCOMTR-MCNC: 251 MG/DL — HIGH (ref 70–99)
GLUCOSE SERPL-MCNC: 160 MG/DL — HIGH (ref 70–99)
HCT VFR BLD CALC: 44.3 % — SIGNIFICANT CHANGE UP (ref 39–50)
HGB BLD-MCNC: 14.2 G/DL — SIGNIFICANT CHANGE UP (ref 13–17)
MAGNESIUM SERPL-MCNC: 1.8 MG/DL — SIGNIFICANT CHANGE UP (ref 1.6–2.6)
MCHC RBC-ENTMCNC: 29.6 PG — SIGNIFICANT CHANGE UP (ref 27–34)
MCHC RBC-ENTMCNC: 32.1 G/DL — SIGNIFICANT CHANGE UP (ref 32–36)
MCV RBC AUTO: 92.5 FL — SIGNIFICANT CHANGE UP (ref 80–100)
NRBC BLD AUTO-RTO: 0 /100 WBCS — SIGNIFICANT CHANGE UP (ref 0–0)
PHOSPHATE SERPL-MCNC: 3.4 MG/DL — SIGNIFICANT CHANGE UP (ref 2.5–4.5)
PLATELET # BLD AUTO: 227 K/UL — SIGNIFICANT CHANGE UP (ref 150–400)
POTASSIUM SERPL-MCNC: 3.6 MMOL/L — SIGNIFICANT CHANGE UP (ref 3.5–5.3)
POTASSIUM SERPL-SCNC: 3.6 MMOL/L — SIGNIFICANT CHANGE UP (ref 3.5–5.3)
PROT SERPL-MCNC: 7.5 GM/DL — SIGNIFICANT CHANGE UP (ref 6–8.3)
RBC # BLD: 4.79 M/UL — SIGNIFICANT CHANGE UP (ref 4.2–5.8)
RBC # FLD: 16.7 % — HIGH (ref 10.3–14.5)
SODIUM SERPL-SCNC: 136 MMOL/L — SIGNIFICANT CHANGE UP (ref 135–145)
WBC # BLD: 6.78 K/UL — SIGNIFICANT CHANGE UP (ref 3.8–10.5)
WBC # FLD AUTO: 6.78 K/UL — SIGNIFICANT CHANGE UP (ref 3.8–10.5)

## 2025-07-08 PROCEDURE — 99233 SBSQ HOSP IP/OBS HIGH 50: CPT

## 2025-07-08 RX ADMIN — TAMSULOSIN HYDROCHLORIDE 0.4 MILLIGRAM(S): 0.4 CAPSULE ORAL at 21:29

## 2025-07-08 RX ADMIN — INSULIN GLARGINE-YFGN 15 UNIT(S): 100 INJECTION, SOLUTION SUBCUTANEOUS at 21:30

## 2025-07-08 RX ADMIN — Medication 25 MILLIGRAM(S): at 05:14

## 2025-07-08 RX ADMIN — HEPARIN SODIUM 1200 UNIT(S)/HR: 1000 INJECTION INTRAVENOUS; SUBCUTANEOUS at 19:18

## 2025-07-08 RX ADMIN — FUROSEMIDE 80 MILLIGRAM(S): 10 INJECTION INTRAMUSCULAR; INTRAVENOUS at 17:01

## 2025-07-08 RX ADMIN — INSULIN LISPRO 1: 100 INJECTION, SOLUTION INTRAVENOUS; SUBCUTANEOUS at 16:55

## 2025-07-08 RX ADMIN — ATORVASTATIN CALCIUM 80 MILLIGRAM(S): 80 TABLET, FILM COATED ORAL at 21:30

## 2025-07-08 RX ADMIN — HEPARIN SODIUM 1200 UNIT(S)/HR: 1000 INJECTION INTRAVENOUS; SUBCUTANEOUS at 12:58

## 2025-07-08 RX ADMIN — HEPARIN SODIUM 0 UNIT(S)/HR: 1000 INJECTION INTRAVENOUS; SUBCUTANEOUS at 19:42

## 2025-07-08 RX ADMIN — FUROSEMIDE 80 MILLIGRAM(S): 10 INJECTION INTRAMUSCULAR; INTRAVENOUS at 05:14

## 2025-07-08 RX ADMIN — HEPARIN SODIUM 900 UNIT(S)/HR: 1000 INJECTION INTRAVENOUS; SUBCUTANEOUS at 20:47

## 2025-07-08 RX ADMIN — FINASTERIDE 5 MILLIGRAM(S): 1 TABLET, FILM COATED ORAL at 11:14

## 2025-07-08 RX ADMIN — Medication 5 MILLIGRAM(S): at 22:44

## 2025-07-08 RX ADMIN — HEPARIN SODIUM 1500 UNIT(S)/HR: 1000 INJECTION INTRAVENOUS; SUBCUTANEOUS at 07:26

## 2025-07-08 RX ADMIN — INSULIN LISPRO 2: 100 INJECTION, SOLUTION INTRAVENOUS; SUBCUTANEOUS at 11:14

## 2025-07-08 RX ADMIN — INSULIN LISPRO 1: 100 INJECTION, SOLUTION INTRAVENOUS; SUBCUTANEOUS at 08:25

## 2025-07-08 RX ADMIN — HEPARIN SODIUM 0 UNIT(S)/HR: 1000 INJECTION INTRAVENOUS; SUBCUTANEOUS at 09:52

## 2025-07-08 RX ADMIN — HEPARIN SODIUM 1200 UNIT(S)/HR: 1000 INJECTION INTRAVENOUS; SUBCUTANEOUS at 10:55

## 2025-07-09 LAB
ALBUMIN SERPL ELPH-MCNC: 3.2 G/DL — LOW (ref 3.3–5)
ALP SERPL-CCNC: 110 U/L — SIGNIFICANT CHANGE UP (ref 40–120)
ALT FLD-CCNC: 26 U/L — SIGNIFICANT CHANGE UP (ref 12–78)
ANION GAP SERPL CALC-SCNC: 8 MMOL/L — SIGNIFICANT CHANGE UP (ref 5–17)
APTT BLD: 108.3 SEC — HIGH (ref 26.1–36.8)
APTT BLD: 37.5 SEC — HIGH (ref 26.1–36.8)
APTT BLD: 67.3 SEC — HIGH (ref 26.1–36.8)
AST SERPL-CCNC: 22 U/L — SIGNIFICANT CHANGE UP (ref 15–37)
BILIRUB SERPL-MCNC: 0.9 MG/DL — SIGNIFICANT CHANGE UP (ref 0.2–1.2)
BUN SERPL-MCNC: 37 MG/DL — HIGH (ref 7–23)
CALCIUM SERPL-MCNC: 9.2 MG/DL — SIGNIFICANT CHANGE UP (ref 8.5–10.1)
CHLORIDE SERPL-SCNC: 103 MMOL/L — SIGNIFICANT CHANGE UP (ref 96–108)
CO2 SERPL-SCNC: 26 MMOL/L — SIGNIFICANT CHANGE UP (ref 22–31)
CREAT SERPL-MCNC: 1.3 MG/DL — SIGNIFICANT CHANGE UP (ref 0.5–1.3)
EGFR: 58 ML/MIN/1.73M2 — LOW
EGFR: 58 ML/MIN/1.73M2 — LOW
GLUCOSE BLDC GLUCOMTR-MCNC: 158 MG/DL — HIGH (ref 70–99)
GLUCOSE BLDC GLUCOMTR-MCNC: 165 MG/DL — HIGH (ref 70–99)
GLUCOSE BLDC GLUCOMTR-MCNC: 190 MG/DL — HIGH (ref 70–99)
GLUCOSE BLDC GLUCOMTR-MCNC: 268 MG/DL — HIGH (ref 70–99)
GLUCOSE SERPL-MCNC: 161 MG/DL — HIGH (ref 70–99)
HCT VFR BLD CALC: 43.2 % — SIGNIFICANT CHANGE UP (ref 39–50)
HGB BLD-MCNC: 13.8 G/DL — SIGNIFICANT CHANGE UP (ref 13–17)
INR BLD: 1.17 RATIO — HIGH (ref 0.85–1.16)
MAGNESIUM SERPL-MCNC: 1.5 MG/DL — LOW (ref 1.6–2.6)
MCHC RBC-ENTMCNC: 29.7 PG — SIGNIFICANT CHANGE UP (ref 27–34)
MCHC RBC-ENTMCNC: 31.9 G/DL — LOW (ref 32–36)
MCV RBC AUTO: 92.9 FL — SIGNIFICANT CHANGE UP (ref 80–100)
NRBC BLD AUTO-RTO: 0 /100 WBCS — SIGNIFICANT CHANGE UP (ref 0–0)
PHOSPHATE SERPL-MCNC: 3.4 MG/DL — SIGNIFICANT CHANGE UP (ref 2.5–4.5)
PLATELET # BLD AUTO: 258 K/UL — SIGNIFICANT CHANGE UP (ref 150–400)
POTASSIUM SERPL-MCNC: 3.3 MMOL/L — LOW (ref 3.5–5.3)
POTASSIUM SERPL-SCNC: 3.3 MMOL/L — LOW (ref 3.5–5.3)
PROT SERPL-MCNC: 7.1 GM/DL — SIGNIFICANT CHANGE UP (ref 6–8.3)
PROTHROM AB SERPL-ACNC: 13.6 SEC — HIGH (ref 9.9–13.4)
RBC # BLD: 4.65 M/UL — SIGNIFICANT CHANGE UP (ref 4.2–5.8)
RBC # FLD: 16.7 % — HIGH (ref 10.3–14.5)
SODIUM SERPL-SCNC: 137 MMOL/L — SIGNIFICANT CHANGE UP (ref 135–145)
WBC # BLD: 6.92 K/UL — SIGNIFICANT CHANGE UP (ref 3.8–10.5)
WBC # FLD AUTO: 6.92 K/UL — SIGNIFICANT CHANGE UP (ref 3.8–10.5)

## 2025-07-09 PROCEDURE — 99233 SBSQ HOSP IP/OBS HIGH 50: CPT

## 2025-07-09 PROCEDURE — 99232 SBSQ HOSP IP/OBS MODERATE 35: CPT

## 2025-07-09 RX ORDER — METOPROLOL SUCCINATE 50 MG/1
25 TABLET, EXTENDED RELEASE ORAL ONCE
Refills: 0 | Status: COMPLETED | OUTPATIENT
Start: 2025-07-09 | End: 2025-07-09

## 2025-07-09 RX ORDER — MAGNESIUM SULFATE 500 MG/ML
1 SYRINGE (ML) INJECTION ONCE
Refills: 0 | Status: COMPLETED | OUTPATIENT
Start: 2025-07-09 | End: 2025-07-09

## 2025-07-09 RX ORDER — METOPROLOL SUCCINATE 50 MG/1
25 TABLET, EXTENDED RELEASE ORAL
Refills: 0 | Status: DISCONTINUED | OUTPATIENT
Start: 2025-07-09 | End: 2025-07-11

## 2025-07-09 RX ORDER — POLYETHYLENE GLYCOL 3350 17 G/17G
17 POWDER, FOR SOLUTION ORAL DAILY
Refills: 0 | Status: DISCONTINUED | OUTPATIENT
Start: 2025-07-09 | End: 2025-07-11

## 2025-07-09 RX ORDER — MAGNESIUM SULFATE 500 MG/ML
2 SYRINGE (ML) INJECTION ONCE
Refills: 0 | Status: COMPLETED | OUTPATIENT
Start: 2025-07-09 | End: 2025-07-09

## 2025-07-09 RX ORDER — APIXABAN 5 MG/1
10 TABLET, FILM COATED ORAL EVERY 12 HOURS
Refills: 0 | Status: DISCONTINUED | OUTPATIENT
Start: 2025-07-09 | End: 2025-07-10

## 2025-07-09 RX ADMIN — FUROSEMIDE 80 MILLIGRAM(S): 10 INJECTION INTRAMUSCULAR; INTRAVENOUS at 17:34

## 2025-07-09 RX ADMIN — INSULIN LISPRO 3: 100 INJECTION, SOLUTION INTRAVENOUS; SUBCUTANEOUS at 11:56

## 2025-07-09 RX ADMIN — METOPROLOL SUCCINATE 25 MILLIGRAM(S): 50 TABLET, EXTENDED RELEASE ORAL at 11:57

## 2025-07-09 RX ADMIN — APIXABAN 10 MILLIGRAM(S): 5 TABLET, FILM COATED ORAL at 17:34

## 2025-07-09 RX ADMIN — Medication 25 GRAM(S): at 13:41

## 2025-07-09 RX ADMIN — HEPARIN SODIUM 700 UNIT(S)/HR: 1000 INJECTION INTRAVENOUS; SUBCUTANEOUS at 11:58

## 2025-07-09 RX ADMIN — Medication 25 MILLIGRAM(S): at 06:43

## 2025-07-09 RX ADMIN — Medication 40 MILLIEQUIVALENT(S): at 10:56

## 2025-07-09 RX ADMIN — FINASTERIDE 5 MILLIGRAM(S): 1 TABLET, FILM COATED ORAL at 11:57

## 2025-07-09 RX ADMIN — POLYETHYLENE GLYCOL 3350 17 GRAM(S): 17 POWDER, FOR SOLUTION ORAL at 13:43

## 2025-07-09 RX ADMIN — INSULIN LISPRO 1: 100 INJECTION, SOLUTION INTRAVENOUS; SUBCUTANEOUS at 17:29

## 2025-07-09 RX ADMIN — Medication 100 GRAM(S): at 10:56

## 2025-07-09 RX ADMIN — HEPARIN SODIUM 700 UNIT(S)/HR: 1000 INJECTION INTRAVENOUS; SUBCUTANEOUS at 03:14

## 2025-07-09 RX ADMIN — ATORVASTATIN CALCIUM 80 MILLIGRAM(S): 80 TABLET, FILM COATED ORAL at 21:11

## 2025-07-09 RX ADMIN — INSULIN GLARGINE-YFGN 15 UNIT(S): 100 INJECTION, SOLUTION SUBCUTANEOUS at 21:37

## 2025-07-09 RX ADMIN — Medication 40 MILLIEQUIVALENT(S): at 11:57

## 2025-07-09 RX ADMIN — HEPARIN SODIUM 700 UNIT(S)/HR: 1000 INJECTION INTRAVENOUS; SUBCUTANEOUS at 07:07

## 2025-07-09 RX ADMIN — INSULIN LISPRO 1: 100 INJECTION, SOLUTION INTRAVENOUS; SUBCUTANEOUS at 08:23

## 2025-07-09 NOTE — DIETITIAN INITIAL EVALUATION ADULT - ADD RECOMMEND
- Suggest removing renal restriction due to pt's K+ and Mg low and pt with no PMH CKD - Suggest removing renal restriction due to pt's K+ and Mg low and pt with no PMH CKD  - Consider providing daily Nephro-Manolo supplement due to pt on diuretics

## 2025-07-09 NOTE — DIETITIAN INITIAL EVALUATION ADULT - EDUCATION DIETARY MODIFICATIONS
Quality 226: Preventive Care And Screening: Tobacco Use: Screening And Cessation Intervention: Patient screened for tobacco use and is an ex/non-smoker Detail Level: Detailed Quality 47: Advance Care Plan: Advance Care Planning discussed and documented; advance care plan or surrogate decision maker documented in the medical record. (R) reinforced previously met goal/verbalization

## 2025-07-09 NOTE — DIETITIAN INITIAL EVALUATION ADULT - PERTINENT MEDS FT
MEDICATIONS  (STANDING):  apixaban 10 milliGRAM(s) Oral every 12 hours  atorvastatin 80 milliGRAM(s) Oral at bedtime  dextrose 5%. 1000 milliLiter(s) (50 mL/Hr) IV Continuous <Continuous>  dextrose 5%. 1000 milliLiter(s) (100 mL/Hr) IV Continuous <Continuous>  dextrose 50% Injectable 25 Gram(s) IV Push once  dextrose 50% Injectable 12.5 Gram(s) IV Push once  dextrose 50% Injectable 25 Gram(s) IV Push once  finasteride 5 milliGRAM(s) Oral daily  furosemide    Tablet 80 milliGRAM(s) Oral every 12 hours  glucagon  Injectable 1 milliGRAM(s) IntraMuscular once  insulin glargine Injectable (LANTUS) 15 Unit(s) SubCutaneous at bedtime  insulin lispro (ADMELOG) corrective regimen sliding scale   SubCutaneous three times a day before meals  metoprolol tartrate 25 milliGRAM(s) Oral two times a day  polyethylene glycol 3350 17 Gram(s) Oral daily  spironolactone 25 milliGRAM(s) Oral daily  tamsulosin 0.4 milliGRAM(s) Oral at bedtime    MEDICATIONS  (PRN):  acetaminophen     Tablet .. 650 milliGRAM(s) Oral every 6 hours PRN Temp greater or equal to 38C (100.4F), Mild Pain (1 - 3)  dextrose Oral Gel 15 Gram(s) Oral once PRN Blood Glucose LESS THAN 70 milliGRAM(s)/deciliter

## 2025-07-09 NOTE — PROGRESS NOTE ADULT - NS ATTEND AMEND GEN_ALL_CORE FT
through shared decision making will proceed with [facility] for treatment of LV thrombus.  pt to follow-up as outpt for ICD eval  cont GDMT as tolerated

## 2025-07-09 NOTE — PROVIDER CONTACT NOTE (CRITICAL VALUE NOTIFICATION) - BACKGROUND
Pt is here for SOB, CHF. on heparin for Left ventricle thrombus
pt admitted due to SOB 2nd to chf exacerbation
CHF  on heparin for a left Ventricle Thrombus

## 2025-07-09 NOTE — DIETITIAN INITIAL EVALUATION ADULT - OTHER INFO
Pt reports  lbs, 5'11" tall, denied any recent weight changes, good appetite prior to admission and currently eating too well. Pt reports does follow a low sodium diet, tries to follow a diabetic diet most of the time and has been seen by a nutritionist for diabetes management. Pt stated used to take insulin but disliked poking himself so was taken off and put on Metformin. Pt stated compliant with his diuretics, now potassium is low which writer explained likely due to Lasix diuretics he's on but pt's electrolytes being repleted per medicine team. Pt stated hasn't had a BM x7 days, RN aware and giving pt stool softeners. Encouraged pt to walk down the halls if he is able and allowed to help stimulate GI motility. Pt declined need for any low sodium or diabetic diet review/handouts a this time.

## 2025-07-09 NOTE — PROGRESS NOTE ADULT - TIME BILLING
The necessity of the time spent during the encounter on this date of service was due to:   - Ordering, reviewing, and interpreting labs, testing, and imaging.  - Independently obtaining a review of systems and performing a physical exam  - Reviewing prior hospitalization and where necessary, outpatient records.  - Reviewing consultant recommendations/communicating with consultants  - Counselling and educating patient and family regarding interpretation of aforementioned items and plan of care.    Time-based billing (NON-critical care). Total minutes spent: 38
Time-based billing (NON-critical care). Total minutes spent: The necessity of the time spent during the encounter on this date of service was due to:   - Ordering, reviewing, and interpreting labs, testing, and imaging.  - Independently obtaining a review of systems and performing a physical exam  - Reviewing prior hospitalization and where necessary, outpatient records.  - Reviewing consultant recommendations/communicating with consultants  - Counselling and educating patient and family regarding interpretation of aforementioned items and plan of care.    Time-based billing (NON-critical care). Total minutes spent: 52
The necessity of the time spent during the encounter on this date of service was due to:   - Ordering, reviewing, and interpreting labs, testing, and imaging.  - Independently obtaining a review of systems and performing a physical exam  - Reviewing prior hospitalization and where necessary, outpatient records.  - Reviewing consultant recommendations/communicating with consultants  - Counselling and educating patient and family regarding interpretation of aforementioned items and plan of care.    Time-based billing (NON-critical care). Total minutes spent: 38
The necessity of the time spent during the encounter on this date of service was due to:   - Ordering, reviewing, and interpreting labs, testing, and imaging.  - Independently obtaining a review of systems and performing a physical exam  - Reviewing prior hospitalization and where necessary, outpatient records.  - Reviewing consultant recommendations/communicating with consultants  - Counselling and educating patient and family regarding interpretation of aforementioned items and plan of care.    Time-based billing (NON-critical care). Total minutes spent: 52

## 2025-07-09 NOTE — DIETITIAN INITIAL EVALUATION ADULT - PERTINENT LABORATORY DATA
07-09    137  |  103  |  37[H]  ----------------------------<  161[H]  3.3[L]   |  26  |  1.30    Ca    9.2      09 Jul 2025 06:00  Phos  3.4     07-09  Mg     1.5     07-09    TPro  7.1  /  Alb  3.2[L]  /  TBili  0.9  /  DBili  x   /  AST  22  /  ALT  26  /  AlkPhos  110  07-09  POCT Blood Glucose.: 268 mg/dL (07-09-25 @ 11:32)  A1C with Estimated Average Glucose Result: 10.8 % (07-04-25 @ 06:47)  A1C with Estimated Average Glucose Result: 8.3 % (07-11-24 @ 05:40)  A1C with Estimated Average Glucose Result: 8.3 % (07-10-24 @ 06:58)

## 2025-07-09 NOTE — DIETITIAN INITIAL EVALUATION ADULT - PROBLEM SELECTOR PLAN 6
- pt denies hx (poor historian)  - documented in [facility] ?with bone mets  - obtain collateral info in am

## 2025-07-10 ENCOUNTER — TRANSCRIPTION ENCOUNTER (OUTPATIENT)
Age: 75
End: 2025-07-10

## 2025-07-10 LAB
ANION GAP SERPL CALC-SCNC: 8 MMOL/L — SIGNIFICANT CHANGE UP (ref 5–17)
BUN SERPL-MCNC: 34 MG/DL — HIGH (ref 7–23)
CALCIUM SERPL-MCNC: 9.3 MG/DL — SIGNIFICANT CHANGE UP (ref 8.5–10.1)
CHLORIDE SERPL-SCNC: 103 MMOL/L — SIGNIFICANT CHANGE UP (ref 96–108)
CO2 SERPL-SCNC: 25 MMOL/L — SIGNIFICANT CHANGE UP (ref 22–31)
CREAT SERPL-MCNC: 1.49 MG/DL — HIGH (ref 0.5–1.3)
EGFR: 49 ML/MIN/1.73M2 — LOW
EGFR: 49 ML/MIN/1.73M2 — LOW
GLUCOSE BLDC GLUCOMTR-MCNC: 139 MG/DL — HIGH (ref 70–99)
GLUCOSE BLDC GLUCOMTR-MCNC: 180 MG/DL — HIGH (ref 70–99)
GLUCOSE BLDC GLUCOMTR-MCNC: 184 MG/DL — HIGH (ref 70–99)
GLUCOSE BLDC GLUCOMTR-MCNC: 191 MG/DL — HIGH (ref 70–99)
GLUCOSE SERPL-MCNC: 137 MG/DL — HIGH (ref 70–99)
HCT VFR BLD CALC: 42.6 % — SIGNIFICANT CHANGE UP (ref 39–50)
HGB BLD-MCNC: 13.5 G/DL — SIGNIFICANT CHANGE UP (ref 13–17)
MAGNESIUM SERPL-MCNC: 1.9 MG/DL — SIGNIFICANT CHANGE UP (ref 1.6–2.6)
MCHC RBC-ENTMCNC: 29.2 PG — SIGNIFICANT CHANGE UP (ref 27–34)
MCHC RBC-ENTMCNC: 31.7 G/DL — LOW (ref 32–36)
MCV RBC AUTO: 92 FL — SIGNIFICANT CHANGE UP (ref 80–100)
NRBC BLD AUTO-RTO: 0 /100 WBCS — SIGNIFICANT CHANGE UP (ref 0–0)
PLATELET # BLD AUTO: 253 K/UL — SIGNIFICANT CHANGE UP (ref 150–400)
POTASSIUM SERPL-MCNC: 3.8 MMOL/L — SIGNIFICANT CHANGE UP (ref 3.5–5.3)
POTASSIUM SERPL-SCNC: 3.8 MMOL/L — SIGNIFICANT CHANGE UP (ref 3.5–5.3)
RBC # BLD: 4.63 M/UL — SIGNIFICANT CHANGE UP (ref 4.2–5.8)
RBC # FLD: 16.5 % — HIGH (ref 10.3–14.5)
SODIUM SERPL-SCNC: 136 MMOL/L — SIGNIFICANT CHANGE UP (ref 135–145)
WBC # BLD: 6.84 K/UL — SIGNIFICANT CHANGE UP (ref 3.8–10.5)
WBC # FLD AUTO: 6.84 K/UL — SIGNIFICANT CHANGE UP (ref 3.8–10.5)

## 2025-07-10 PROCEDURE — 99239 HOSP IP/OBS DSCHRG MGMT >30: CPT

## 2025-07-10 RX ORDER — APIXABAN 5 MG/1
5 TABLET, FILM COATED ORAL EVERY 12 HOURS
Refills: 0 | Status: DISCONTINUED | OUTPATIENT
Start: 2025-07-10 | End: 2025-07-11

## 2025-07-10 RX ORDER — INSULIN GLARGINE-YFGN 100 [IU]/ML
15 INJECTION, SOLUTION SUBCUTANEOUS
Qty: 1 | Refills: 0
Start: 2025-07-10 | End: 2025-08-08

## 2025-07-10 RX ORDER — ATORVASTATIN CALCIUM 80 MG/1
1 TABLET, FILM COATED ORAL
Qty: 0 | Refills: 0 | DISCHARGE
Start: 2025-07-10

## 2025-07-10 RX ORDER — APIXABAN 5 MG/1
1 TABLET, FILM COATED ORAL
Qty: 60 | Refills: 0
Start: 2025-07-10 | End: 2025-08-08

## 2025-07-10 RX ORDER — MAGNESIUM CITRATE
296 SOLUTION, ORAL ORAL ONCE
Refills: 0 | Status: COMPLETED | OUTPATIENT
Start: 2025-07-10 | End: 2025-07-10

## 2025-07-10 RX ORDER — INSULIN GLARGINE-YFGN 100 [IU]/ML
15 INJECTION, SOLUTION SUBCUTANEOUS
Qty: 15 | Refills: 0
Start: 2025-07-10 | End: 2025-08-08

## 2025-07-10 RX ORDER — FUROSEMIDE 10 MG/ML
1 INJECTION INTRAMUSCULAR; INTRAVENOUS
Qty: 7 | Refills: 0
Start: 2025-07-10 | End: 2025-07-16

## 2025-07-10 RX ORDER — METOPROLOL SUCCINATE 50 MG/1
1 TABLET, EXTENDED RELEASE ORAL
Qty: 60 | Refills: 0
Start: 2025-07-10 | End: 2025-08-08

## 2025-07-10 RX ORDER — TORSEMIDE 10 MG
1 TABLET ORAL
Qty: 30 | Refills: 0
Start: 2025-07-10 | End: 2025-08-08

## 2025-07-10 RX ORDER — CARVEDILOL 3.12 MG/1
0 TABLET, FILM COATED ORAL
Qty: 0 | Refills: 0 | DISCHARGE

## 2025-07-10 RX ORDER — TAMSULOSIN HYDROCHLORIDE 0.4 MG/1
1 CAPSULE ORAL
Qty: 0 | Refills: 0 | DISCHARGE
Start: 2025-07-10

## 2025-07-10 RX ORDER — FINASTERIDE 1 MG/1
1 TABLET, FILM COATED ORAL
Qty: 0 | Refills: 0 | DISCHARGE
Start: 2025-07-10

## 2025-07-10 RX ORDER — SPIRONOLACTONE 25 MG
1 TABLET ORAL
Qty: 0 | Refills: 0 | DISCHARGE
Start: 2025-07-10

## 2025-07-10 RX ADMIN — INSULIN LISPRO 1: 100 INJECTION, SOLUTION INTRAVENOUS; SUBCUTANEOUS at 11:46

## 2025-07-10 RX ADMIN — Medication 296 MILLILITER(S): at 11:46

## 2025-07-10 RX ADMIN — METOPROLOL SUCCINATE 25 MILLIGRAM(S): 50 TABLET, EXTENDED RELEASE ORAL at 17:25

## 2025-07-10 RX ADMIN — FUROSEMIDE 80 MILLIGRAM(S): 10 INJECTION INTRAMUSCULAR; INTRAVENOUS at 08:10

## 2025-07-10 RX ADMIN — ATORVASTATIN CALCIUM 80 MILLIGRAM(S): 80 TABLET, FILM COATED ORAL at 22:04

## 2025-07-10 RX ADMIN — POLYETHYLENE GLYCOL 3350 17 GRAM(S): 17 POWDER, FOR SOLUTION ORAL at 11:13

## 2025-07-10 RX ADMIN — INSULIN GLARGINE-YFGN 15 UNIT(S): 100 INJECTION, SOLUTION SUBCUTANEOUS at 22:03

## 2025-07-10 RX ADMIN — TAMSULOSIN HYDROCHLORIDE 0.4 MILLIGRAM(S): 0.4 CAPSULE ORAL at 22:04

## 2025-07-10 RX ADMIN — FUROSEMIDE 80 MILLIGRAM(S): 10 INJECTION INTRAMUSCULAR; INTRAVENOUS at 17:25

## 2025-07-10 RX ADMIN — INSULIN LISPRO 1: 100 INJECTION, SOLUTION INTRAVENOUS; SUBCUTANEOUS at 17:19

## 2025-07-10 RX ADMIN — Medication 25 MILLIGRAM(S): at 09:36

## 2025-07-10 RX ADMIN — METOPROLOL SUCCINATE 25 MILLIGRAM(S): 50 TABLET, EXTENDED RELEASE ORAL at 05:09

## 2025-07-10 RX ADMIN — APIXABAN 10 MILLIGRAM(S): 5 TABLET, FILM COATED ORAL at 05:09

## 2025-07-10 RX ADMIN — APIXABAN 5 MILLIGRAM(S): 5 TABLET, FILM COATED ORAL at 17:25

## 2025-07-10 RX ADMIN — FINASTERIDE 5 MILLIGRAM(S): 1 TABLET, FILM COATED ORAL at 11:13

## 2025-07-10 NOTE — DISCHARGE NOTE NURSING/CASE MANAGEMENT/SOCIAL WORK - PATIENT PORTAL LINK FT
You can access the FollowMyHealth Patient Portal offered by [facility] by registering at the following website: [email or web address]. By joining [facility]’s FollowMyHealth portal, you will also be able to view your health information using other applications (apps) compatible with our system.

## 2025-07-10 NOTE — DISCHARGE NOTE PROVIDER - NSDCFUSCHEDAPPT_GEN_ALL_CORE_FT
Post-Care Instructions: I reviewed with the patient in detail post-care instructions. Patient is to wear sunprotection, and avoid picking at any of the treated lesions. Pt may apply Vaseline to crusted or scabbing areas. Duration Of Freeze Thaw-Cycle (Seconds): 3 Consent: The patient's consent was obtained including but not limited to risks of crusting, scabbing, blistering, scarring, darker or lighter pigmentary change, recurrence, incomplete removal and infection. Number Of Freeze-Thaw Cycles: 1 freeze-thaw cycle Detail Level: Detailed Render Post-Care Instructions In Note?: yes [facility]  CARDIOLOGY [address]  Scheduled Appointment: 08/08/2025    [facility]  FAMILYMED [address]  Scheduled Appointment: 08/20/2025    [facility]  ENDOCRIN [address]  Scheduled Appointment: 08/21/2025    Reji Spivey  [facility]  NEPHRO [address]  Scheduled Appointment: 09/04/2025

## 2025-07-10 NOTE — DISCHARGE NOTE PROVIDER - HOSPITAL COURSE
74-year-old male with past medical history of systolic CHF(last known EF 20%), hypertension, hyperlipidemia, type 2 diabetes, and BPH no prostate cancer who presented to the ED due to shortness of breath for the last 5days. Found to be in CHF exacerbation,    New cardiac thrombus on TTE   Acute on chronic systolic congestive heart failure.   Elevated troponin.   ZAHEER (acute kidney injury).   DM2 (diabetes mellitus, type 2).     On 7/10/25 this case was reviewed with Dr. Holland, the patient is medically stable and optimized for discharge. Discharge Diagnoses:    Acute on chronic systolic congestive heart failure exacerbation  Elevated troponin, likely secondary to CHF exacerbation  Acute kidney injury (ZAHEER), likely prerenal   Type 2 diabetes mellitus (DM2)  Benign prostatic hyperplasia (BPH)  Hypertension  Hyperlipidemia  Type 2 diabetes  BPH    Hospital Course:    This 74-year-old male with a history of systolic CHF, hypertension, hyperlipidemia, type 2 diabetes, and BPH presented with shortness of breath for five days. He was found to be in CHF exacerbation. A transthoracic echocardiogram (TTE) showed a cardiac thrombus, for which Cardiology was consulted and the patient was started on eliquis. Patient instructed to f/u outpatient with cardio and EP for possible AICD placement.    He presented with tachypnea and was managed with IV furosemide, strict intake and output monitoring, and daily weights. His home medications included spironolactone, carvedilol, sacubitril/valsartan, empagliflozin, and torsemide. Empagliflozin and carvedilol were temporarily held due to an AV block and hypotension. His elevated troponin was thought to be secondary to the CHF exacerbation and was trending down. An elevated D-dimer prompted further workup for pulmonary embolism. Due to his ZAHEER, a CT angiogram was not performed, but a V/Q scan showed a low probability of PE. A lower extremity Doppler ultrasound was negative for deep vein thrombosis. His ZAHEER, likely prerenal in the setting of CHF, was managed with diuretics. Nephrology was consulted. His diabetes was managed with sliding scale insulin, point-of-care blood glucose checks, a hypoglycemia protocol, and a carbohydrate-controlled diet. His A1c was 10 and started on lantus which he will be discharged with. A renal ultrasound showed an enlarged prostate consistent with his known BPH. He received subcutaneous heparin for DVT prophylaxis.     Patient seen and evaluated. DC home. DC time 37 mins. Discharge Diagnoses:    Acute on chronic systolic congestive heart failure exacerbation  Elevated troponin, likely secondary to CHF exacerbation  Acute kidney injury (ZAHEER), likely prerenal   Type 2 diabetes mellitus (DM2)  Benign prostatic hyperplasia (BPH)  Hypertension  Hyperlipidemia  Type 2 diabetes  BPH    Hospital Course:    This 74-year-old male with a history of systolic CHF, hypertension, hyperlipidemia, type 2 diabetes, and BPH presented with shortness of breath for five days. He was found to be in CHF exacerbation. A transthoracic echocardiogram (TTE) showed a cardiac thrombus, for which Cardiology was consulted and the patient was started on eliquis. Patient instructed to f/u outpatient with cardio and EP for possible AICD placement.    He presented with tachypnea and was managed with IV furosemide, strict intake and output monitoring, and daily weights. His home medications included spironolactone, carvedilol, sacubitril/valsartan, empagliflozin, and torsemide. Entresto and carvedilol were temporarily held due to an AV block and hypotension. His elevated troponin was thought to be secondary to the CHF exacerbation and was trending down. An elevated D-dimer prompted further workup for pulmonary embolism. Due to his ZAHEER, a CT angiogram was not performed, but a V/Q scan showed a low probability of PE. A lower extremity Doppler ultrasound was negative for deep vein thrombosis. His ZAHEER, likely prerenal in the setting of CHF, was managed with diuretics. Nephrology was consulted. His diabetes was managed with sliding scale insulin, point-of-care blood glucose checks, a hypoglycemia protocol, and a carbohydrate-controlled diet. His A1c was 10 and started on lantus which he will be discharged with. A renal ultrasound showed an enlarged prostate consistent with his known BPH. He received subcutaneous heparin for DVT prophylaxis.     Patient seen and evaluated. DC home. DC time 37 mins.

## 2025-07-10 NOTE — DISCHARGE NOTE PROVIDER - NSDCCPCAREPLAN_GEN_ALL_CORE_FT
PRINCIPAL DISCHARGE DIAGNOSIS  Diagnosis: RIVAS (dyspnea on exertion)  Assessment and Plan of Treatment:      PRINCIPAL DISCHARGE DIAGNOSIS  Diagnosis: Acute on chronic systolic congestive heart failure  Assessment and Plan of Treatment: Seen by cardiology and nephrology here. You were treated with IV lasix which is a diuretic, which improved your symptoms. Continue to take your home torsemide as prescribed. You had an echocardiogram here which showed a cardiac thrombus. For this reason, you were started on a blood thinner- Eliquis, which you must take twice daily.   Please follow up with your cardiologist as soon as possible for further management.   Also follow up with PCP.      SECONDARY DISCHARGE DIAGNOSES  Diagnosis: DM2 (diabetes mellitus, type 2)  Assessment and Plan of Treatment: Your A1C was high and you were started on insulin while in the hospital. Please continue as prescribed and check your blood sugar regularly. Continue taking metformin as well.   Please follow up with an endocrinologist and PCP.

## 2025-07-10 NOTE — PHARMACOTHERAPY INTERVENTION NOTE - COMMENTS
Recommended switching apixaban dose to 5 mg BID to maintain consistency with LV thrombus treatment dosing recommendations.

## 2025-07-10 NOTE — DISCHARGE NOTE NURSING/CASE MANAGEMENT/SOCIAL WORK - FINANCIAL ASSISTANCE
[facility] provides services at a reduced cost to those who are determined to be eligible through [facility]’s financial assistance program. Information regarding [facility]’s financial assistance program can be found by going to [email or web address] or by calling [phone].

## 2025-07-10 NOTE — DISCHARGE NOTE PROVIDER - NSDCFUADDAPPT_GEN_ALL_CORE_FT
Needs cardiology follow up  Needs repeat ECHO    APPTS ARE READY TO BE MADE: [X] YES    Best Family or Patient Contact (if needed):    Additional Information about above appointments (if needed):    1: cardiology  2:   3:     Other comments or requests: Repeat TTE   Needs cardiology follow up  Needs repeat ECHO    APPTS ARE READY TO BE MADE: [X] YES    Best Family or Patient Contact (if needed):    Additional Information about above appointments (if needed):    1: cardiology  2: endocrinology  3: PCP    Other comments or requests: Repeat TTE   Needs cardiology follow up  Needs repeat ECHO    APPTS ARE READY TO BE MADE: [X] YES    Best Family or Patient Contact (if needed):    Additional Information about above appointments (if needed):    1: cardiology  2: endocrinology  3: PCP    Other comments or requests: Repeat TTE    Prior to outreaching the patient, it was visible that the patient has secured a follow up appointment which was not scheduled by our team. Patient was scheduled on 8/20 at 320P at [address] with Dr. Nely Loo     Prior to outreaching the patient, it was visible that the patient has secured a follow up appointment which was not scheduled by our team. Patient was scheduled on 7/18 at 1P at [address] with Dr. Caputo     Prior to outreaching the patient, it was visible that the patient has secured a follow up appointment which was not scheduled by our team. Patient was scheduled on 8/21 at 3P at [address] with Dr. Sarah Butt

## 2025-07-10 NOTE — DISCHARGE NOTE PROVIDER - NSDCMRMEDTOKEN_GEN_ALL_CORE_FT
allopurinol 300 mg oral tablet: 1 tab(s) orally once a day  atorvastatin 80 mg oral tablet: 1 tab(s) orally once a day (at bedtime)  CARVEDILOL 12.5 MG TABLET: TAKE 0.5 TABLETS (6.25 MG TOTAL) BY MOUTH 2 (TWO) TIMES A DAY WITH MEALS IN THE MORNING AND EVENING  empagliflozin 10 mg oral tablet: 1 tab(s) orally once a day  finasteride 5 mg oral tablet: 1 tab(s) orally once a day  glipiZIDE 10 mg oral tablet, extended release: 1 tab(s) orally once a day  metFORMIN 1000 mg oral tablet: 1 tab(s) orally 2 times a day  sacubitril-valsartan 97 mg-103 mg oral tablet: 1 tab(s) orally 2 times a day  spironolactone 25 mg oral tablet: 1 tab(s) orally once a day  tamsulosin 0.4 mg oral capsule: 1 cap(s) orally once a day (at bedtime)  torsemide 20 mg oral tablet: 1 tab(s) orally once a day  traMADol 50 mg oral tablet: 1 tab(s) orally every 6 hours as needed for  severe pain   allopurinol 300 mg oral tablet: 1 tab(s) orally once a day  apixaban 5 mg oral tablet: 1 tab(s) orally every 12 hours  atorvastatin 80 mg oral tablet: 1 tab(s) orally once a day (at bedtime)  empagliflozin 10 mg oral tablet: 1 tab(s) orally once a day  finasteride 5 mg oral tablet: 1 tab(s) orally once a day  glipiZIDE 10 mg oral tablet, extended release: 1 tab(s) orally once a day  metFORMIN 1000 mg oral tablet: 1 tab(s) orally 2 times a day  metoprolol tartrate 25 mg oral tablet: 1 tab(s) orally 2 times a day  sacubitril-valsartan 97 mg-103 mg oral tablet: 1 tab(s) orally 2 times a day  spironolactone 25 mg oral tablet: 1 tab(s) orally once a day  tamsulosin 0.4 mg oral capsule: 1 cap(s) orally once a day (at bedtime)  torsemide 20 mg oral tablet: 1 tab(s) orally once a day  traMADol 50 mg oral tablet: 1 tab(s) orally every 6 hours as needed for  severe pain   allopurinol 300 mg oral tablet: 1 tab(s) orally once a day  apixaban 5 mg oral tablet: 1 tab(s) orally every 12 hours  atorvastatin 80 mg oral tablet: 1 tab(s) orally once a day (at bedtime)  empagliflozin 10 mg oral tablet: 1 tab(s) orally once a day  finasteride 5 mg oral tablet: 1 tab(s) orally once a day  furosemide 80 mg oral tablet: 1 tab(s) orally once a day  glipiZIDE 10 mg oral tablet, extended release: 1 tab(s) orally once a day  insulin glargine 100 units/mL subcutaneous solution: 15 unit(s) subcutaneous once a day (at bedtime)  metFORMIN 1000 mg oral tablet: 1 tab(s) orally 2 times a day  metoprolol tartrate 25 mg oral tablet: 1 tab(s) orally 2 times a day  sacubitril-valsartan 97 mg-103 mg oral tablet: 1 tab(s) orally 2 times a day  spironolactone 25 mg oral tablet: 1 tab(s) orally once a day  tamsulosin 0.4 mg oral capsule: 1 cap(s) orally once a day (at bedtime)  traMADol 50 mg oral tablet: 1 tab(s) orally every 6 hours as needed for  severe pain   allopurinol 300 mg oral tablet: 1 tab(s) orally once a day  apixaban 5 mg oral tablet: 1 tab(s) orally every 12 hours  apixaban 5 mg oral tablet: 1 tab(s) orally every 12 hours  atorvastatin 80 mg oral tablet: 1 tab(s) orally once a day (at bedtime)  empagliflozin 10 mg oral tablet: 1 tab(s) orally once a day  finasteride 5 mg oral tablet: 1 tab(s) orally once a day  furosemide 80 mg oral tablet: 1 tab(s) orally once a day  glipiZIDE 10 mg oral tablet, extended release: 1 tab(s) orally once a day  glucometer (per patient&#x27;s insurance): Test blood sugars four times a day. Dispense #1 glucometer.  insulin glargine 100 units/mL subcutaneous solution: 15 unit(s) subcutaneous once a day (at bedtime)  Insulin Pen Needles, 4mm: 1 application subcutaneously 4 times a day. ** Use with insulin pen **  lancets: 1 application subcutaneously 4 times a day  Lantus Solostar Pen 100 units/mL subcutaneous solution: 15 unit(s) subcutaneous once a day (at bedtime) unit(s) subcutaneous once a day  metFORMIN 1000 mg oral tablet: 1 tab(s) orally 2 times a day  metoprolol tartrate 25 mg oral tablet: 1 tab(s) orally 2 times a day  sacubitril-valsartan 97 mg-103 mg oral tablet: 1 tab(s) orally 2 times a day  spironolactone 25 mg oral tablet: 1 tab(s) orally once a day  tamsulosin 0.4 mg oral capsule: 1 cap(s) orally once a day (at bedtime)  test strips (per patient&#x27;s insurance): 1 application subcutaneously 4 times a day. ** Compatible with patient&#x27;s glucometer **  torsemide 20 mg oral tablet: 1 tab(s) orally once a day  traMADol 50 mg oral tablet: 1 tab(s) orally every 6 hours as needed for  severe pain   allopurinol 300 mg oral tablet: 1 tab(s) orally once a day  apixaban 5 mg oral tablet: 1 tab(s) orally every 12 hours  apixaban 5 mg oral tablet: 1 tab(s) orally every 12 hours  atorvastatin 80 mg oral tablet: 1 tab(s) orally once a day (at bedtime)  empagliflozin 10 mg oral tablet: 1 tab(s) orally once a day  finasteride 5 mg oral tablet: 1 tab(s) orally once a day  glipiZIDE 10 mg oral tablet, extended release: 1 tab(s) orally once a day  glucometer (per patient&#x27;s insurance): Test blood sugars four times a day. Dispense #1 glucometer.  insulin glargine 100 units/mL subcutaneous solution: 15 unit(s) subcutaneous once a day (at bedtime)  Insulin Pen Needles, 4mm: 1 application subcutaneously 4 times a day. ** Use with insulin pen **  lancets: 1 application subcutaneously 4 times a day  metoprolol tartrate 25 mg oral tablet: 1 tab(s) orally 2 times a day  spironolactone 25 mg oral tablet: 1 tab(s) orally once a day  tamsulosin 0.4 mg oral capsule: 1 cap(s) orally once a day (at bedtime)  test strips (per patient&#x27;s insurance): 1 application subcutaneously 4 times a day. ** Compatible with patient&#x27;s glucometer **  torsemide 20 mg oral tablet: 1 tab(s) orally once a day   alcohol swabs : Apply topically to affected area 4 times a day  allopurinol 300 mg oral tablet: 1 tab(s) orally once a day  apixaban 5 mg oral tablet: 1 tab(s) orally every 12 hours  apixaban 5 mg oral tablet: 1 tab(s) orally every 12 hours  atorvastatin 80 mg oral tablet: 1 tab(s) orally once a day (at bedtime)  Dexcom G7 : Use as directed  Dexcom G7 Sensors: Change every 10 days  empagliflozin 10 mg oral tablet: 1 tab(s) orally once a day  finasteride 5 mg oral tablet: 1 tab(s) orally once a day  Freestyle Phan 3 Reynolds: Dispense 1 Reynolds  Freestyle Phan 3 Sensors: Please change every 14 days  glipiZIDE 10 mg oral tablet, extended release: 1 tab(s) orally once a day  glucometer (per patient&#x27;s insurance): Test blood sugars four times a day. Dispense #1 glucometer.  glucometer (per patient&#x27;s insurance): Test blood sugars four times a day. Dispense #1 glucometer.  insulin glargine 100 units/mL subcutaneous solution: 15 unit(s) subcutaneous once a day (at bedtime)  Insulin Pen Needles, 4mm: 1 application subcutaneously 4 times a day. ** Use with insulin pen **  Insulin Pen Needles, 4mm: 1 application subcutaneously 4 times a day. ** Use with insulin pen **  lancets: 1 application subcutaneously 4 times a day  lancets: 1 application subcutaneously 4 times a day  lancets: 1 application subcutaneously 4 times a day  metoprolol tartrate 25 mg oral tablet: 1 tab(s) orally 2 times a day  spironolactone 25 mg oral tablet: 1 tab(s) orally once a day  tamsulosin 0.4 mg oral capsule: 1 cap(s) orally once a day (at bedtime)  test strips (per patient&#x27;s insurance): 1 application subcutaneously 4 times a day. ** Compatible with patient&#x27;s glucometer **  test strips (per patient&#x27;s insurance): 1 application subcutaneously 4 times a day. ** Compatible with patient&#x27;s glucometer **  torsemide 20 mg oral tablet: 1 tab(s) orally once a day

## 2025-07-10 NOTE — DISCHARGE NOTE NURSING/CASE MANAGEMENT/SOCIAL WORK - FLU SEASON?
2024       Joe Putnam MD  [address] [facility] [address]  Via In Basket      Patient: Rocco Felder   YOB: 1951   Date of Visit: 2024       Dear Dr. Putnam:    Thank you for referring Rocco Felder to me for evaluation. Below are my notes for this visit with him.    If you have questions, please do not hesitate to call me. I look forward to following your patient along with you.      Sincerely,        Nando Galan MD        CC: No Recipients  Nando Galan MD  2024 12:41 PM  Signed    Patient Name: Rocco Felder  MRN: [id number]  : 1951    Referring Provider  Titi Holm DO    HPI:   This 73-year-old male underwent radiofrequency cavotricuspid isthmus ablation in 2018.  Typical atrial flutter terminated during the ablation and bidirectional block was established across the isthmus, and block persisted despite an isoproterenol infusion.   He has been diagnosed with sleep apnea, and he uses a BiPAP mask.  He is is less fatigued during the day since beginning that therapy.  He is not physically active.  He denies palpitations, angina, dyspnea, lightheadedness, or abnormal bleeding.  He has whitecoat hypertension, and his blood pressure at home is generally in the 125/60s range.    He has been diagnosed with low-grade prostate cancer.  He is anticoagulated.     TTE: Ejection fraction 61%, mild LVH, ascending aorta dilated (4 cm), RV mildly dilated, mild TR, RV systolic pressure 55 mm.   exercise stress test: 1: 37 minutes of the Tanner protocol, heart rate to 127 bpm, with polymorphic PVCs including couplets.   lab results: Creatinine 0.79, potassium 4.5, total cholesterol 111, triglycerides 40, HDL 59, and LDL 44.     Coronary risk factors: Hypertension, diabetes, and hyperlipidemia.     Electrophysiology family history: Negative     Social history: . 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  Nonsmoker.     Lab Results   Component Value Date     WBC 5.5 12/16/2021    WBC 5.9 07/14/2020    HGB 11.6 (L) 12/16/2021    HGB 12.4 (L) 07/14/2020    HCT 36.7 (L) 12/16/2021    HCT 37.5 (L) 07/14/2020     12/16/2021     07/14/2020    MCV 94.8 12/16/2021    MCV 93.1 07/14/2020     Lab Results   Component Value Date    CO2 26 12/16/2021    CO2 27 06/18/2020    BUN 17 12/16/2021    BUN 19 06/18/2020    GLUCOSE 106 (H) 12/16/2021    GLUCOSE 106 (H) 06/18/2020     Lab Results   Component Value Date    AST 11 12/16/2021    AST 17 06/18/2020     No results found for: PT, INR  Lab Results   Component Value Date    TIBC 243 (L) 06/18/2020    IRON 81 06/18/2020     Lab Results   Component Value Date    TSH 1.254 12/11/2018     No components found for: A1C    Review of Systems:   Review of Systems   Constitutional: Negative.    HENT: Negative.    Eyes: Negative.    Respiratory: Negative.    Cardiovascular:        As per HPI   Gastrointestinal: Negative.    Endocrine: Negative.    Genitourinary: Negative.    Musculoskeletal: Negative.    Skin: Negative.    Allergic/Immunologic: Negative.    Neurological: Negative.    Hematological: Negative.    Psychiatric/Behavioral: Negative.        Physical Examination:     Physical Exam   Constitutional: He is oriented to person, place, and time. He appears well-developed.   Overweight   HENT:   Head: Normocephalic and atraumatic.   Right Ear: External ear normal.   Left Ear: External ear normal.   Nose: Nose normal.   Mouth/Throat: Oropharynx is clear and moist.   Eyes: Pupils are equal, round, and reactive to light. Conjunctivae and EOM are normal.   Neck: Normal range of motion. Neck supple.   Cardiovascular: Normal rate, regular rhythm, normal heart sounds and intact distal pulses.  Ectopics noted.  Pulmonary/Chest: Effort normal and breath sounds normal.   Abdominal: Soft. Bowel sounds are normal. Musculoskeletal: Normal range of motion.     Neurological: He is alert and oriented to person, place, and time. He has  normal reflexes.   Skin: Skin is warm.   Psychiatric: He has a normal mood and affect. His behavior is normal. Judgment and thought content normal.        Current Outpatient Medications   Medication Sig Dispense Refill   • DAVID CONTOUR NEXT TEST test strip USE TO TEST BLOOD SUGAR 3 TIMES DAILY. 100 each 3   • Microlet Lancets Misc USE TO TEST BLOOD SUGAR 3 TIMES PER DAY AS DIRECTED. 100 each 3   • apixaBAN (Eliquis) 5 MG Tab Take 1 tablet by mouth every 12 hours. 180 tablet 2   • lisinopril (ZESTRIL) 20 MG tablet Take 1 tablet by mouth daily 90 tablet 1   • hydrochlorothiazide (HYDRODIURIL) 12.5 MG tablet Take 1 tablet by mouth daily 90 tablet 1   • metFORMIN (GLUCOPHAGE) 500 MG tablet Take 1 tablet by mouth 2 times daily (with meals). 180 tablet 0   • atorvastatin (LIPITOR) 40 MG tablet TAKE ONE TABLET BY MOUTH ONE TIME DAILY 90 tablet 1   • fluticasone (FLONASE) 50 MCG/ACT nasal spray Spray 2 sprays in each nostril daily. 16 g 11   • Carboxymethylcellulose Sodium (EYE DROPS OP) Indications: For moisture     • sildenafil (VIAGRA) 50 MG tablet TAKE 1 TABLET DAILY 1 HOUR BEFORE NEEDED     • cetirizine (ZYRTEC) 10 MG tablet nightly.        No current facility-administered medications for this visit.     ALLERGIES:   Allergen Reactions   • Seasonal Runny Nose     Results for orders placed or performed in visit on 06/25/24   Electrocardiogram 12-Lead    Impression    Sinus bradycardia 46 bpm    Left anterior fascicular block    Poor R wave progression            Assessment/Plan:       Typical atrial flutter    \"Typical\" right atrial flutter is unlikely to recur. Nevertheless, he remains at increased  lifetime risk for developing atrial fibrillation.    Plan:    Follow-up with electrophysiology as indicated     At risk for stroke       The patient's [id number] score is \"3\" and he has a very large left atrium. The risk/    benefit favors lifelong anticoagulation. The patient understands that apixaban increases  his  bleeding risk.     Essential hypertension  By his description his blood pressure is adequately controlled.     Morbid obesity       Obesity, independent of its association with sleep apnea, increases the risk for atrial    tachyarrhythmias.  See above.     Obstructive sleep apnea       Untreated sleep apnea is a potent risk factor for atrial tachyarrhythmias.  He is now being treated.  Weight loss is recommended.    Pulmonary hypertension  Probably group 2 and group 3.    Plan:    1.  Treat sleep apnea.  2.  Weight loss.  3.  Control systemic hypertension.  4.  Follow-up with Dr. Max Bass in 6 months    Hyperlipidemia  When last checked, his LDL was at goal.    Plan:    Follow lipid profile and LFTs.    At risk for coronary artery disease  The patient is not specifically symptomatic, but has multiple coronary risk factors.  His stress nuclear study was normal, albeit with poor exercise tolerance.      Nando Galan MD   No

## 2025-07-10 NOTE — DISCHARGE NOTE NURSING/CASE MANAGEMENT/SOCIAL WORK - NSDCPEFALRISK_GEN_ALL_CORE
For information on Fall & Injury Prevention, visit: [email or web address] OR  [email or web address] OR  https://www.cdc.gov/steadi/patient.html

## 2025-07-10 NOTE — DISCHARGE NOTE NURSING/CASE MANAGEMENT/SOCIAL WORK - NSDCFUADDAPPT_GEN_ALL_CORE_FT
Needs cardiology follow up  Needs repeat ECHO    APPTS ARE READY TO BE MADE: [X] YES    Best Family or Patient Contact (if needed):    Additional Information about above appointments (if needed):    1: cardiology  2: endocrinology  3: PCP    Other comments or requests: Repeat TTE

## 2025-07-10 NOTE — DISCHARGE NOTE PROVIDER - CARE PROVIDER_API CALL
PCP,   Phone: (   )    -  Fax: (   )    -  Follow Up Time:    PCP,   Phone: (   )    -  Fax: (   )    -  Follow Up Time:     Roberto Olivier  Endocrinology Diabetes and Metabolism  [address] [place] [address], [facility] [address]  [place] [address]  Phone: [phone]  Fax: [phone]  Follow Up Time: 1 week

## 2025-07-10 NOTE — DISCHARGE NOTE PROVIDER - PROVIDER TOKENS
FREE:[LAST:[PCP],PHONE:[(   )    -],FAX:[(   )    -]] FREE:[LAST:[PCP],PHONE:[(   )    -],FAX:[(   )    -]],PROVIDER:[TOKEN:[[id number]],FOLLOWUP:[1 week]]

## 2025-07-11 VITALS
OXYGEN SATURATION: 97 % | RESPIRATION RATE: 18 BRPM | TEMPERATURE: 98 F | SYSTOLIC BLOOD PRESSURE: 93 MMHG | DIASTOLIC BLOOD PRESSURE: 63 MMHG | HEART RATE: 73 BPM

## 2025-07-11 LAB
GLUCOSE BLDC GLUCOMTR-MCNC: 150 MG/DL — HIGH (ref 70–99)
GLUCOSE BLDC GLUCOMTR-MCNC: 181 MG/DL — HIGH (ref 70–99)
HCT VFR BLD CALC: 40.5 % — SIGNIFICANT CHANGE UP (ref 39–50)
HGB BLD-MCNC: 13.3 G/DL — SIGNIFICANT CHANGE UP (ref 13–17)
MCHC RBC-ENTMCNC: 29.7 PG — SIGNIFICANT CHANGE UP (ref 27–34)
MCHC RBC-ENTMCNC: 32.8 G/DL — SIGNIFICANT CHANGE UP (ref 32–36)
MCV RBC AUTO: 90.4 FL — SIGNIFICANT CHANGE UP (ref 80–100)
NRBC BLD AUTO-RTO: 0 /100 WBCS — SIGNIFICANT CHANGE UP (ref 0–0)
PLATELET # BLD AUTO: 248 K/UL — SIGNIFICANT CHANGE UP (ref 150–400)
RBC # BLD: 4.48 M/UL — SIGNIFICANT CHANGE UP (ref 4.2–5.8)
RBC # FLD: 16.6 % — HIGH (ref 10.3–14.5)
WBC # BLD: 6 K/UL — SIGNIFICANT CHANGE UP (ref 3.8–10.5)
WBC # FLD AUTO: 6 K/UL — SIGNIFICANT CHANGE UP (ref 3.8–10.5)

## 2025-07-11 PROCEDURE — 99232 SBSQ HOSP IP/OBS MODERATE 35: CPT

## 2025-07-11 RX ORDER — TORSEMIDE 10 MG
20 TABLET ORAL DAILY
Refills: 0 | Status: DISCONTINUED | OUTPATIENT
Start: 2025-07-11 | End: 2025-07-11

## 2025-07-11 RX ADMIN — APIXABAN 5 MILLIGRAM(S): 5 TABLET, FILM COATED ORAL at 05:46

## 2025-07-11 RX ADMIN — METOPROLOL SUCCINATE 25 MILLIGRAM(S): 50 TABLET, EXTENDED RELEASE ORAL at 05:46

## 2025-07-11 RX ADMIN — Medication 25 MILLIGRAM(S): at 05:46

## 2025-07-11 RX ADMIN — FUROSEMIDE 80 MILLIGRAM(S): 10 INJECTION INTRAMUSCULAR; INTRAVENOUS at 05:46

## 2025-07-11 NOTE — PROGRESS NOTE ADULT - PROBLEM SELECTOR PLAN 7
- DVT ppx: heparin SQ  - GI ppx: not required att  - Reassess need daily

## 2025-07-11 NOTE — PROGRESS NOTE ADULT - PROBLEM SELECTOR PROBLEM 3
Positive D dimer

## 2025-07-11 NOTE — PROGRESS NOTE ADULT - PROBLEM SELECTOR PLAN 1
- p/w tachypnea  - Likely due to acute on chronic CHF exacerbation  - Last known EF(2024) 20%; pt states he has declined AICD now is amenable to outpatient   - Currently on room air  - home meds: spironolactone, carvedilol, Entresto, Jardiance, torsemide  Jardiance held coreg held  secondary to AV block and low bP   - likely 2/2 CHF exacerbation  - c/w lasix 40 mg iv BID  - strict i/os  - daily weights
- p/w tachypnea  - Likely due to acute on chronic CHF exacerbation  - Last known EF(2024) 20%; pt states he has declined AICD now is amenable to outpatient   - repeat echo with ejection fraction of 18 %, severe (grade 3) left ventricular diastolic dysfunction. 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  - Currently on room air  - home meds: spironolactone, carvedilol, Entresto, Jardiance, torsemide  Jardiance held coreg held  secondary to AV block and low bP   - likely 2/2 CHF exacerbation  - lasix transitioned to home torsemide   - strict i/os  - daily weights
- p/w tachypnea  - Likely due to acute on chronic CHF exacerbation  - Last known EF(2024) 20%; pt states he has declined AICD now is amenable to outpatient   - Currently on room air  - home meds: spironolactone, carvedilol, Entresto, Jardiance, torsemide  Jardiance held coreg held  secondary to AV block and low bP   - likely 2/2 CHF exacerbation  - c/w lasix 40 mg iv BID  - strict i/os  - daily weights
- p/w tachypnea  - Likely due to acute on chronic CHF exacerbation  - Last known EF(2024) 20%; pt states he has declined AICD now is amenable to outpatient   - Currently on room air  - home meds: spironolactone, carvedilol, Entresto, Jardiance, torsemide   - likely 2/2 CHF exacerbation  - c/w lasix 40 mg iv BID  - strict i/os  - daily weights

## 2025-07-11 NOTE — PROGRESS NOTE ADULT - SUBJECTIVE AND OBJECTIVE BOX
Cardiology Progress Note    Interval Events:  new LV thrombus noted on TTE      MEDICATIONS:  furosemide    Tablet 80 milliGRAM(s) Oral every 12 hours  heparin   Injectable 8000 Unit(s) IV Push every 6 hours PRN  heparin   Injectable 4000 Unit(s) IV Push every 6 hours PRN  heparin  Infusion. 1500 Unit(s)/Hr IV Continuous <Continuous>  spironolactone 25 milliGRAM(s) Oral daily  acetaminophen     Tablet .. 650 milliGRAM(s) Oral every 6 hours PRN  atorvastatin 80 milliGRAM(s) Oral at bedtime  dextrose 50% Injectable 25 Gram(s) IV Push once  dextrose 50% Injectable 12.5 Gram(s) IV Push once  dextrose 50% Injectable 25 Gram(s) IV Push once  dextrose Oral Gel 15 Gram(s) Oral once PRN  finasteride 5 milliGRAM(s) Oral daily  glucagon  Injectable 1 milliGRAM(s) IntraMuscular once  insulin glargine Injectable (LANTUS) 15 Unit(s) SubCutaneous at bedtime  insulin lispro (ADMELOG) corrective regimen sliding scale   SubCutaneous three times a day before meals    dextrose 5%. 1000 milliLiter(s) IV Continuous <Continuous>  dextrose 5%. 1000 milliLiter(s) IV Continuous <Continuous>  tamsulosin 0.4 milliGRAM(s) Oral at bedtime      PHYSICAL EXAM:  T(C): 36.2 (07-08-25 @ 10:16), Max: 36.5 (07-07-25 @ 17:42)  HR: 80 (07-08-25 @ 10:16) (78 - 92)  BP: 106/70 (07-08-25 @ 10:16) (101/76 - 126/77)  RR: 18 (07-08-25 @ 10:16) (16 - 18)  SpO2: 90% (07-08-25 @ 10:16) (90% - 94%)  Wt(kg): --  I&O's Summary    07 Jul 2025 07:01  -  08 Jul 2025 07:00  --------------------------------------------------------  IN: 0 mL / OUT: 2100 mL / NET: -2100 mL      Appearance: No acute distress  HEENT:   mmm  Cardiovascular: Normal S1 S2, no elevated JVP, no murmurs, no edema  Respiratory: Lungs clear to auscultation	, good air movement  Psychiatry: A & O x 3, Mood & affect appropriate  Gastrointestinal:  soft nt  Skin: no cyanosis	  Neurologic: grossly non-focal  Extremities: Normal range of motion, no clubbing, cyanosis or edema  Vascular: Peripheral pulses palpable bilaterally    LABS:	 	  CBC Full  -  ( 08 Jul 2025 07:28 )  WBC Count : 6.78 K/uL  Hemoglobin : 14.2 g/dL  Hematocrit : 44.3 %  Platelet Count - Automated : 227 K/uL    07-08  136  |  103  |  39[H]  ----------------------------<  160[H]  3.6   |  26  |  1.48[H]    07-07  137  |  102  |  40[H]  ----------------------------<  237[H]  3.7   |  25  |  1.67[H]    Ca    9.9      08 Jul 2025 07:28  Ca    10.0      07 Jul 2025 06:30  Phos  3.4     07-08  Phos  4.1     07-07  Mg     1.8     07-08  Mg     1.7     07-07    TPro  7.5  /  Alb  3.7  /  TBili  1.0  /  DBili  x   /  AST  23  /  ALT  27  /  AlkPhos  98  07-08    CARDIAC MARKERS:      TELEMETRY: 	  SR, PVC's, PAC's  ECG:  	  RADIOLOGY:  OTHER: 	    PREVIOUS DIAGNOSTIC TESTING:    [x] Echocardiogram: < from: TTE Echo Complete w/ Contrast w/ Doppler (07.07.25 @ 16:56) >  CONCLUSIONS:     1. Left ventricular cavity is severely dilated. Left ventricular   systolic function is severely decreased with an ejection fraction of 18 %   by Chen's method of disks.   2. There is severe (grade 3) left ventricular diastolic dysfunction.   3. 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   4. Enlarged right ventricular cavity size and reduced right ventricular   systolic function.   5. Left atrium is severely dilated.   6. The right atrium is mildly dilated.   7. Moderate mitral regurgitation.   8. Estimated pulmonary artery systolic pressure is 40 mmHg, consistent   with mild pulmonary hypertension.   9. Tricuspid aortic valve with normal leaflet excursion.  10. Mild aortic regurgitation.  11. Compared to the transthoracic echocardiogram performed on 7/10/2024,   there is now a left ventricular thrombus.    < end of copied text >    [ ] Catheterization:  [ ] Stress Test:  	
HPI:  74-year-old male with past medical history of systolic CHF(last known EF 20%), hypertension, hyperlipidemia, type 2 diabetes, and BPH, ?prostate ca who presented to the ED due to shortness of breath for the last 5days. The patient lives in the [place] but son-in-law is a respiratory therapist in this hospital so decided to bring him in. Patient reports that it took him 20 minutes to get to the corner store( which usually takes five minutes) due to shortness of breath. States SOB is only with exertion. + orthopnea. The patient denies any fevers, chills, coughing, chest pain, GI/ symptoms, or other complaints.   On presentation, vital signs were stable, O2 sat 99% on room air, slightly tachypneic. Labs notable for D-dimer of 2282, troponin 95>.84, creatinine 2.4, BNP 10,156. The patient received Lasix and 500 cc bolus in the ED.  (03 Jul 2025 08:25)  Patient is a 74y old  Male who presents with a chief complaint of RIVAS likely due to CHF exacerbation (07 Jul 2025 21:40)      INTERVAL HPI/OVERNIGHT EVENTS: no acute overnight events     MEDICATIONS  (STANDING):  atorvastatin 80 milliGRAM(s) Oral at bedtime  dextrose 5%. 1000 milliLiter(s) (50 mL/Hr) IV Continuous <Continuous>  dextrose 5%. 1000 milliLiter(s) (100 mL/Hr) IV Continuous <Continuous>  dextrose 50% Injectable 25 Gram(s) IV Push once  dextrose 50% Injectable 12.5 Gram(s) IV Push once  dextrose 50% Injectable 25 Gram(s) IV Push once  finasteride 5 milliGRAM(s) Oral daily  furosemide    Tablet 80 milliGRAM(s) Oral every 12 hours  glucagon  Injectable 1 milliGRAM(s) IntraMuscular once  heparin  Infusion. 1500 Unit(s)/Hr (15 mL/Hr) IV Continuous <Continuous>  insulin glargine Injectable (LANTUS) 15 Unit(s) SubCutaneous at bedtime  insulin lispro (ADMELOG) corrective regimen sliding scale   SubCutaneous three times a day before meals  spironolactone 25 milliGRAM(s) Oral daily  tamsulosin 0.4 milliGRAM(s) Oral at bedtime    MEDICATIONS  (PRN):  acetaminophen     Tablet .. 650 milliGRAM(s) Oral every 6 hours PRN Temp greater or equal to 38C (100.4F), Mild Pain (1 - 3)  dextrose Oral Gel 15 Gram(s) Oral once PRN Blood Glucose LESS THAN 70 milliGRAM(s)/deciliter  heparin   Injectable 8000 Unit(s) IV Push every 6 hours PRN For aPTT less than 40  heparin   Injectable 4000 Unit(s) IV Push every 6 hours PRN For aPTT between 40 - 57      Allergies    No Known Allergies    Intolerances        REVIEW OF SYSTEMS:  no new complaints     Vital Signs Last 24 Hrs  T(C): 36.2 (08 Jul 2025 04:38), Max: 36.5 (07 Jul 2025 17:42)  T(F): 97.2 (08 Jul 2025 04:38), Max: 97.7 (07 Jul 2025 17:42)  HR: 86 (08 Jul 2025 04:38) (80 - 92)  BP: 126/77 (08 Jul 2025 04:38) (101/76 - 126/77)  BP(mean): 87 (07 Jul 2025 18:27) (87 - 87)  RR: 18 (08 Jul 2025 04:38) (16 - 18)  SpO2: 93% (08 Jul 2025 04:38) (93% - 94%)    Parameters below as of 07 Jul 2025 10:47  Patient On (Oxygen Delivery Method): room air        PHYSICAL EXAM:  GENERAL: NAD, well-groomed, well-developed  HEAD:  Atraumatic, Normocephalic  EYES: EOMI, PERRLA, conjunctiva and sclera clear  ENMT: No tonsillar erythema, exudates, or enlargement; Moist mucous membranes, Good dentition, No lesions  NECK: Supple, No JVD, Normal thyroid  NERVOUS SYSTEM:  Alert & Oriented X3, Good concentration; Motor Strength 5/5 B/L upper and lower extremities; DTRs 2+ intact and symmetric  CHEST/LUNG: Clear to ascultation  bilaterally; No rales, rhonchi, wheezing, or rubs  HEART: Regular rate and rhythm; No murmurs, rubs, or gallops  ABDOMEN: Soft, Nontender, Nondistended; Bowel sounds present  EXTREMITIES:  2+ Peripheral Pulses, No clubbing, cyanosis, or edema  LYMPH: No lymphadenopathy noted  SKIN: No rashes or lesions    LABS:                        13.7   5.94  )-----------( 232      ( 07 Jul 2025 06:30 )             42.7     07-07    137  |  102  |  40[H]  ----------------------------<  237[H]  3.7   |  25  |  1.67[H]    Ca    10.0      07 Jul 2025 06:30  Phos  4.1     07-07  Mg     1.7     07-07      PT/INR - ( 07 Jul 2025 19:20 )   PT: 14.8 sec;   INR: 1.28 ratio         PTT - ( 07 Jul 2025 21:10 )  PTT:>200.0 sec  Urinalysis Basic - ( 07 Jul 2025 06:30 )    Color: x / Appearance: x / SG: x / pH: x  Gluc: 237 mg/dL / Ketone: x  / Bili: x / Urobili: x   Blood: x / Protein: x / Nitrite: x   Leuk Esterase: x / RBC: x / WBC x   Sq Epi: x / Non Sq Epi: x / Bacteria: x      CAPILLARY BLOOD GLUCOSE      POCT Blood Glucose.: 153 mg/dL (08 Jul 2025 07:39)  POCT Blood Glucose.: 174 mg/dL (07 Jul 2025 21:41)  POCT Blood Glucose.: 269 mg/dL (07 Jul 2025 16:29)  POCT Blood Glucose.: 211 mg/dL (07 Jul 2025 11:03)      RADIOLOGY & ADDITIONAL TESTS:    Imaging Personally Reviewed:  [ ] YES  [ ] NO    Consultant(s) Notes Reviewed:  [ ] YES  [ ] NO    Care Discussed with Consultants/Other Providers [ ] YES  [ ] NO
[facility] NEPHROLOGY SERVICES, [facility]  NEPHROLOGY AND HYPERTENSION  [address]  [address]  [place] [address]  [phone]    MD ADRIANNA URIOSTEGUI MD YELENA ROSENBERG, MD BINNY KOSHY, MD CHRISTOPHER CAPUTO, MD EDWARD BOVER, MD          Patient events noted    MEDICATIONS  (STANDING):  apixaban 5 milliGRAM(s) Oral every 12 hours  atorvastatin 80 milliGRAM(s) Oral at bedtime  dextrose 5%. 1000 milliLiter(s) (50 mL/Hr) IV Continuous <Continuous>  dextrose 5%. 1000 milliLiter(s) (100 mL/Hr) IV Continuous <Continuous>  dextrose 50% Injectable 25 Gram(s) IV Push once  dextrose 50% Injectable 12.5 Gram(s) IV Push once  dextrose 50% Injectable 25 Gram(s) IV Push once  finasteride 5 milliGRAM(s) Oral daily  glucagon  Injectable 1 milliGRAM(s) IntraMuscular once  insulin glargine Injectable (LANTUS) 15 Unit(s) SubCutaneous at bedtime  insulin lispro (ADMELOG) corrective regimen sliding scale   SubCutaneous three times a day before meals  metoprolol tartrate 25 milliGRAM(s) Oral two times a day  polyethylene glycol 3350 17 Gram(s) Oral daily  spironolactone 25 milliGRAM(s) Oral daily  tamsulosin 0.4 milliGRAM(s) Oral at bedtime  torsemide 20 milliGRAM(s) Oral daily    MEDICATIONS  (PRN):  acetaminophen     Tablet .. 650 milliGRAM(s) Oral every 6 hours PRN Temp greater or equal to 38C (100.4F), Mild Pain (1 - 3)  dextrose Oral Gel 15 Gram(s) Oral once PRN Blood Glucose LESS THAN 70 milliGRAM(s)/deciliter      07-10-25 @ 07:01  -  07-11-25 @ 07:00  --------------------------------------------------------  IN: 0 mL / OUT: 400 mL / NET: -400 mL      PHYSICAL EXAM:      T(C): 36.8 (07-11-25 @ 11:01), Max: 36.9 (07-10-25 @ 23:40)  HR: 73 (07-11-25 @ 11:01) (67 - 88)  BP: 93/63 (07-11-25 @ 11:01) (93/63 - 115/70)  RR: 18 (07-11-25 @ 11:01) (18 - 18)  SpO2: 97% (07-11-25 @ 11:01) (97% - 98%)  Wt(kg): --  Lungs clear  Heart S1S2  Abd soft NT ND  Extremities:   tr edema                                    13.3   6.00  )-----------( 248      ( 11 Jul 2025 08:42 )             40.5     07-10    136  |  103  |  34[H]  ----------------------------<  137[H]  3.8   |  25  |  1.49[H]    Ca    9.3      10 Jul 2025 06:28  Mg     1.9     07-10          Creatinine Trend: 1.49<--, 1.30<--, 1.48<--, 1.67<--, 1.52<--, 1.57<--      Assessment   ZAHEER CKD 3; CRS; low EF  Elevated D dimers; Low prob v/q  LE doppler neg    Plan  Maintenance diuretics to po, Torsemide 20 mg daily  Discharge planning       Michael Mckeon MD
HPI:  74-year-old male with past medical history of systolic CHF(last known EF 20%), hypertension, hyperlipidemia, type 2 diabetes, and BPH, ?prostate ca who presented to the ED due to shortness of breath for the last 5days. The patient lives in the [place] but son-in-law is a respiratory therapist in this hospital so decided to bring him in. Patient reports that it took him 20 minutes to get to the corner store( which usually takes five minutes) due to shortness of breath. States SOB is only with exertion. + orthopnea. The patient denies any fevers, chills, coughing, chest pain, GI/ symptoms, or other complaints.   On presentation, vital signs were stable, O2 sat 99% on room air, slightly tachypneic. Labs notable for D-dimer of 2282, troponin 95>.84, creatinine 2.4, BNP 10,156. The patient received Lasix and 500 cc bolus in the ED.  (03 Jul 2025 08:25)  Patient is a 74y old  Male who presents with a chief complaint of RIVAS likely due to CHF exacerbation (09 Jul 2025 15:43)      INTERVAL HPI/OVERNIGHT EVENTS: no acute events     MEDICATIONS  (STANDING):  apixaban 10 milliGRAM(s) Oral every 12 hours  atorvastatin 80 milliGRAM(s) Oral at bedtime  dextrose 5%. 1000 milliLiter(s) (50 mL/Hr) IV Continuous <Continuous>  dextrose 5%. 1000 milliLiter(s) (100 mL/Hr) IV Continuous <Continuous>  dextrose 50% Injectable 25 Gram(s) IV Push once  dextrose 50% Injectable 12.5 Gram(s) IV Push once  dextrose 50% Injectable 25 Gram(s) IV Push once  finasteride 5 milliGRAM(s) Oral daily  furosemide    Tablet 80 milliGRAM(s) Oral every 12 hours  glucagon  Injectable 1 milliGRAM(s) IntraMuscular once  insulin glargine Injectable (LANTUS) 15 Unit(s) SubCutaneous at bedtime  insulin lispro (ADMELOG) corrective regimen sliding scale   SubCutaneous three times a day before meals  metoprolol tartrate 25 milliGRAM(s) Oral two times a day  polyethylene glycol 3350 17 Gram(s) Oral daily  spironolactone 25 milliGRAM(s) Oral daily  tamsulosin 0.4 milliGRAM(s) Oral at bedtime    MEDICATIONS  (PRN):  acetaminophen     Tablet .. 650 milliGRAM(s) Oral every 6 hours PRN Temp greater or equal to 38C (100.4F), Mild Pain (1 - 3)  dextrose Oral Gel 15 Gram(s) Oral once PRN Blood Glucose LESS THAN 70 milliGRAM(s)/deciliter      Allergies    No Known Allergies    Intolerances        REVIEW OF SYSTEMS:  no complaints     Vital Signs Last 24 Hrs  T(C): 36.5 (10 Jul 2025 04:38), Max: 36.8 (09 Jul 2025 23:37)  T(F): 97.7 (10 Jul 2025 04:38), Max: 98.2 (09 Jul 2025 23:37)  HR: 93 (10 Jul 2025 04:38) (49 - 162)  BP: 101/78 (10 Jul 2025 04:38) (94/69 - 118/74)  BP(mean): --  RR: 18 (10 Jul 2025 04:38) (18 - 18)  SpO2: 98% (10 Jul 2025 04:38) (96% - 98%)        PHYSICAL EXAM:  GENERAL: NAD, well-groomed, well-developed  HEAD:  Atraumatic, Normocephalic  EYES: EOMI, PERRLA, conjunctiva and sclera clear  ENMT: No tonsillar erythema, exudates, or enlargement; Moist mucous membranes, Good dentition, No lesions  NECK: Supple, No JVD, Normal thyroid  NERVOUS SYSTEM:  Alert & Oriented X3, Good concentration; Motor Strength 5/5 B/L upper and lower extremities; DTRs 2+ intact and symmetric  CHEST/LUNG: Clear to ascultation  bilaterally; No rales, rhonchi, wheezing, or rubs  HEART: Regular rate and rhythm; No murmurs, rubs, or gallops  ABDOMEN: Soft, Nontender, Nondistended; Bowel sounds present  EXTREMITIES:  2+ Peripheral Pulses, No clubbing, cyanosis, or edema  LYMPH: No lymphadenopathy noted  SKIN: No rashes or lesions    LABS:                        13.8   6.92  )-----------( 258      ( 09 Jul 2025 06:00 )             43.2     07-09    137  |  103  |  37[H]  ----------------------------<  161[H]  3.3[L]   |  26  |  1.30    Ca    9.2      09 Jul 2025 06:00  Phos  3.4     07-09  Mg     1.5     07-09    TPro  7.1  /  Alb  3.2[L]  /  TBili  0.9  /  DBili  x   /  AST  22  /  ALT  26  /  AlkPhos  110  07-09    PT/INR - ( 09 Jul 2025 06:00 )   PT: 13.6 sec;   INR: 1.17 ratio         PTT - ( 09 Jul 2025 18:25 )  PTT:37.5 sec  Urinalysis Basic - ( 09 Jul 2025 06:00 )    Color: x / Appearance: x / SG: x / pH: x  Gluc: 161 mg/dL / Ketone: x  / Bili: x / Urobili: x   Blood: x / Protein: x / Nitrite: x   Leuk Esterase: x / RBC: x / WBC x   Sq Epi: x / Non Sq Epi: x / Bacteria: x      CAPILLARY BLOOD GLUCOSE      POCT Blood Glucose.: 158 mg/dL (09 Jul 2025 21:30)  POCT Blood Glucose.: 190 mg/dL (09 Jul 2025 17:17)  POCT Blood Glucose.: 268 mg/dL (09 Jul 2025 11:32)  POCT Blood Glucose.: 165 mg/dL (09 Jul 2025 07:50)      RADIOLOGY & ADDITIONAL TESTS:    Imaging Personally Reviewed:  [ ] YES  [ ] NO    Consultant(s) Notes Reviewed:  [ ] YES  [ ] NO    Care Discussed with Consultants/Other Providers [ ] YES  [ ] NO
[facility] NEPHROLOGY SERVICES, [facility]  NEPHROLOGY AND HYPERTENSION  [address]  [address]  [place] [address]  [phone]    MD ADRIANNA URIOSTEGUI MD YELENA ROSENBERG, MD BINNY KOSHY, MD CHRISTOPHER CAPUTO, MD EDWARD BOVER, MD          Patient events noted    MEDICATIONS  (STANDING):  atorvastatin 80 milliGRAM(s) Oral at bedtime  dextrose 5%. 1000 milliLiter(s) (50 mL/Hr) IV Continuous <Continuous>  dextrose 5%. 1000 milliLiter(s) (100 mL/Hr) IV Continuous <Continuous>  dextrose 50% Injectable 25 Gram(s) IV Push once  dextrose 50% Injectable 12.5 Gram(s) IV Push once  dextrose 50% Injectable 25 Gram(s) IV Push once  finasteride 5 milliGRAM(s) Oral daily  furosemide   Injectable 40 milliGRAM(s) IV Push every 12 hours  glucagon  Injectable 1 milliGRAM(s) IntraMuscular once  heparin  Infusion.  Unit(s)/Hr (18 mL/Hr) IV Continuous <Continuous>  insulin glargine Injectable (LANTUS) 15 Unit(s) SubCutaneous at bedtime  insulin lispro (ADMELOG) corrective regimen sliding scale   SubCutaneous three times a day before meals  spironolactone 25 milliGRAM(s) Oral daily  tamsulosin 0.4 milliGRAM(s) Oral at bedtime    MEDICATIONS  (PRN):  acetaminophen     Tablet .. 650 milliGRAM(s) Oral every 6 hours PRN Temp greater or equal to 38C (100.4F), Mild Pain (1 - 3)  dextrose Oral Gel 15 Gram(s) Oral once PRN Blood Glucose LESS THAN 70 milliGRAM(s)/deciliter      07-06-25 @ 07:01  -  07-07-25 @ 07:00  --------------------------------------------------------  IN: 220 mL / OUT: 450 mL / NET: -230 mL    07-07-25 @ 07:01  -  07-07-25 @ 21:40  --------------------------------------------------------  IN: 0 mL / OUT: 1000 mL / NET: -1000 mL      PHYSICAL EXAM:      T(C): 36.5 (07-07-25 @ 17:42), Max: 36.5 (07-07-25 @ 00:04)  HR: 90 (07-07-25 @ 18:27) (76 - 92)  BP: 109/75 (07-07-25 @ 18:27) (92/64 - 109/75)  RR: 16 (07-07-25 @ 17:42) (16 - 18)  SpO2: 94% (07-07-25 @ 17:42) (94% - 94%)  Wt(kg): --  Lungs clear  Heart S1S2  Abd soft NT ND  Extremities:   tr edema                                    13.7   5.94  )-----------( 232      ( 07 Jul 2025 06:30 )             42.7     07-07    137  |  102  |  40[H]  ----------------------------<  237[H]  3.7   |  25  |  1.67[H]    Ca    10.0      07 Jul 2025 06:30  Phos  4.1     07-07  Mg     1.7     07-07          Creatinine Trend: 1.67<--, 1.52<--, 1.57<--, 2.18<--, 2.42<--        Assessment   ZAHEER CKD 3; CRS; low EF  Elevated D dimers; Low prob v/q  LE doppler neg  Plan  Maintenance diuretics to po  Discussed with family     Michael Mckeon MD
Patient is a 74y old  Male who presents with a chief complaint of RIVAS     PAST MEDICAL & SURGICAL HISTORY:  HTN (hypertension)    HLD (hyperlipidemia)    BPH (benign prostatic hyperplasia)    NICM    BPH    Acute on chronic systolic congestive heart failure    DM (diabetes mellitus)    INTERVAL HISTORY: in no distress, denies any chest pain or sob   	  MEDICATIONS:  MEDICATIONS  (STANDING):  atorvastatin 80 milliGRAM(s) Oral at bedtime  finasteride 5 milliGRAM(s) Oral daily  furosemide    Tablet 80 milliGRAM(s) Oral every 12 hours  heparin  Infusion. 1500 Unit(s)/Hr (15 mL/Hr) IV Continuous <Continuous>  insulin glargine Injectable (LANTUS) 15 Unit(s) SubCutaneous at bedtime  insulin lispro (ADMELOG) corrective regimen sliding scale   SubCutaneous three times a day before meals  magnesium sulfate  IVPB 2 Gram(s) IV Intermittent once  metoprolol tartrate 25 milliGRAM(s) Oral two times a day  polyethylene glycol 3350 17 Gram(s) Oral daily  spironolactone 25 milliGRAM(s) Oral daily  tamsulosin 0.4 milliGRAM(s) Oral at bedtime    MEDICATIONS  (PRN):  acetaminophen     Tablet .. 650 milliGRAM(s) Oral every 6 hours PRN Temp greater or equal to 38C (100.4F), Mild Pain (1 - 3)  dextrose Oral Gel 15 Gram(s) Oral once PRN Blood Glucose LESS THAN 70 milliGRAM(s)/deciliter  heparin   Injectable 8000 Unit(s) IV Push every 6 hours PRN For aPTT less than 40  heparin   Injectable 4000 Unit(s) IV Push every 6 hours PRN For aPTT between 40 - 57    Vitals:  T(F): 97.6 (07-09-25 @ 10:36), Max: 97.6 (07-08-25 @ 15:26)  HR: 90 (07-09-25 @ 10:36) (79 - 90)  BP: 118/74 (07-09-25 @ 10:36) (100/66 - 118/74)  RR: 18 (07-09-25 @ 10:36) (18 - 18)  SpO2: 96% (07-09-25 @ 10:36) (95% - 100%)    07-08 @ 07:01  -  07-09 @ 07:00  --------------------------------------------------------  IN:  Total IN: 0 mL    OUT:    Voided (mL): 600 mL  Total OUT: 600 mL    Total NET: -600 mL    PHYSICAL EXAM:  Neuro: Awake, responsive  CV: S1 S2 RRR  Lungs: CTABL  GI: Soft, BS +, ND, NT  Extremities: No edema    TELEMETRY: 1st degree AVB, PVCs, short runs of NSVTs    RADIOLOGY: < from: Xray Chest 1 View- PORTABLE-Urgent (07.03.25 @ 02:23) >   No radiographic evidence of active chest disease..    < end of copied text >  < from: NM Pulmonary Ventilation/Perfusion Scan (07.03.25 @ 13:14) >    IMPRESSION: Very low probability of pulmonary embolus.    < end of copied text >    DIAGNOSTIC TESTING:    [x ] Echocardiogram: < from: TTE Echo Complete w/ Contrast w/ Doppler (07.07.25 @ 16:56) >   1. Left ventricular cavity is severely dilated. Left ventricular   systolic function is severely decreased with an ejection fraction of 18 %   by Chen's method of disks.   2. There is severe (grade 3) left ventricular diastolic dysfunction.   3. 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   4. Enlarged right ventricular cavity size and reduced right ventricular   systolic function.   5. Left atrium is severely dilated.   6. The right atrium is mildly dilated.   7. Moderate mitral regurgitation.   8. Estimated pulmonary artery systolic pressure is 40 mmHg, consistent   with mild pulmonary hypertension.   9. Tricuspid aortic valve with normal leaflet excursion.  10. Mild aortic regurgitation.  11. Compared to the transthoracic echocardiogram performed on 7/10/2024,   there is now a left ventricular thrombus.    < end of copied text >    LABS:	 	    09 Jul 2025 06:00    137    |  103    |  37     ----------------------------<  161    3.3     |  26     |  1.30   08 Jul 2025 07:28    136    |  103    |  39     ----------------------------<  160    3.6     |  26     |  1.48   07 Jul 2025 06:30    137    |  102    |  40     ----------------------------<  237    3.7     |  25     |  1.67     Ca    9.2        09 Jul 2025 06:00  Phos  3.4       09 Jul 2025 06:00  Mg     1.5       09 Jul 2025 06:00    TPro  7.1    /  Alb  3.2    /  TBili  0.9    /  DBili  x      /  AST  22     /  ALT  26     /  AlkPhos  110    09 Jul 2025 06:00                        13.8   6.92  )-----------( 258      ( 09 Jul 2025 06:00 )             43.2 ,                       14.2   6.78  )-----------( 227      ( 08 Jul 2025 07:28 )             44.3 ,                       13.7   5.94  )-----------( 232      ( 07 Jul 2025 06:30 )             42.7   pro-BNP: Pro-Brain Natriuretic Peptide: 56682 pg/mL (07.03.25 @ 01:17)    INR: 1.17 ratio (07-09 @ 06:00)  INR: 1.28 ratio (07-07 @ 19:20)          
HPI:  74-year-old male with past medical history of systolic CHF(last known EF 20%), hypertension, hyperlipidemia, type 2 diabetes, and BPH, ?prostate ca who presented to the ED due to shortness of breath for the last 5days. The patient lives in the [place] but son-in-law is a respiratory therapist in this hospital so decided to bring him in. Patient reports that it took him 20 minutes to get to the corner store( which usually takes five minutes) due to shortness of breath. States SOB is only with exertion. + orthopnea. The patient denies any fevers, chills, coughing, chest pain, GI/ symptoms, or other complaints.   On presentation, vital signs were stable, O2 sat 99% on room air, slightly tachypneic. Labs notable for D-dimer of 2282, troponin 95>.84, creatinine 2.4, BNP 10,156. The patient received Lasix and 500 cc bolus in the ED.  (03 Jul 2025 08:25)  Patient is a 74y old  Male who presents with a chief complaint of RIVAS likely due to CHF exacerbation (04 Jul 2025 15:49)      INTERVAL HPI/OVERNIGHT EVENTS:    MEDICATIONS  (STANDING):  atorvastatin 80 milliGRAM(s) Oral at bedtime  dextrose 5%. 1000 milliLiter(s) (50 mL/Hr) IV Continuous <Continuous>  dextrose 5%. 1000 milliLiter(s) (100 mL/Hr) IV Continuous <Continuous>  dextrose 50% Injectable 25 Gram(s) IV Push once  dextrose 50% Injectable 12.5 Gram(s) IV Push once  dextrose 50% Injectable 25 Gram(s) IV Push once  finasteride 5 milliGRAM(s) Oral daily  furosemide   Injectable 40 milliGRAM(s) IV Push every 12 hours  glucagon  Injectable 1 milliGRAM(s) IntraMuscular once  heparin   Injectable 5000 Unit(s) SubCutaneous every 12 hours  insulin glargine Injectable (LANTUS) 10 Unit(s) SubCutaneous at bedtime  insulin lispro (ADMELOG) corrective regimen sliding scale   SubCutaneous three times a day before meals  spironolactone 25 milliGRAM(s) Oral daily  tamsulosin 0.4 milliGRAM(s) Oral at bedtime    MEDICATIONS  (PRN):  acetaminophen     Tablet .. 650 milliGRAM(s) Oral every 6 hours PRN Temp greater or equal to 38C (100.4F), Mild Pain (1 - 3)  dextrose Oral Gel 15 Gram(s) Oral once PRN Blood Glucose LESS THAN 70 milliGRAM(s)/deciliter      Allergies    No Known Allergies    Intolerances        REVIEW OF SYSTEMS:  no chest pain feels ok     Vital Signs Last 24 Hrs  T(C): 36.3 (06 Jul 2025 12:32), Max: 36.8 (05 Jul 2025 23:43)  T(F): 97.3 (06 Jul 2025 12:32), Max: 98.2 (05 Jul 2025 23:43)  HR: 92 (06 Jul 2025 13:01) (65 - 94)  BP: 97/65 (06 Jul 2025 12:32) (90/67 - 109/72)  RR: 18 (06 Jul 2025 12:32) (18 - 19)  SpO2: 99% (06 Jul 2025 12:32) (95% - 99%)    Parameters below as of 06 Jul 2025 12:32  Patient On (Oxygen Delivery Method): room air        PHYSICAL EXAM:  GENERAL: NAD, well-groomed, well-developed  HEAD:  Atraumatic, Normocephalic  EYES: EOMI, PERRLA, conjunctiva and sclera clear  ENMT: No tonsillar erythema, exudates, or enlargement; Moist mucous membranes, Good dentition, No lesions  NECK: Supple, No JVD, Normal thyroid  NERVOUS SYSTEM:  Alert & Oriented X3, Good concentration; Motor Strength 5/5 B/L upper and lower extremities; DTRs 2+ intact and symmetric  CHEST/LUNG: Clear to ascultation  bilaterally; No rales, rhonchi, wheezing, or rubs  HEART: Regular rate and rhythm; No murmurs, rubs, or gallops  ABDOMEN: Soft, Nontender, Nondistended; Bowel sounds present  EXTREMITIES:  2+ Peripheral Pulses, No clubbing, cyanosis, or edema  LYMPH: No lymphadenopathy noted  SKIN: No rashes or lesions    LABS:                        13.7   6.70  )-----------( 227      ( 05 Jul 2025 06:30 )             43.2     07-05    137  |  105  |  42[H]  ----------------------------<  206[H]  4.0   |  24  |  1.52[H]    Ca    10.0      05 Jul 2025 06:30  Phos  3.7     07-05  Mg     1.8     07-05    TPro  7.2  /  Alb  3.5  /  TBili  0.9  /  DBili  0.2  /  AST  14[L]  /  ALT  24  /  AlkPhos  103  07-05      Urinalysis Basic - ( 05 Jul 2025 06:30 )    Color: x / Appearance: x / SG: x / pH: x  Gluc: 206 mg/dL / Ketone: x  / Bili: x / Urobili: x   Blood: x / Protein: x / Nitrite: x   Leuk Esterase: x / RBC: x / WBC x   Sq Epi: x / Non Sq Epi: x / Bacteria: x      CAPILLARY BLOOD GLUCOSE      POCT Blood Glucose.: 251 mg/dL (06 Jul 2025 11:47)  POCT Blood Glucose.: 245 mg/dL (06 Jul 2025 07:29)  POCT Blood Glucose.: 164 mg/dL (05 Jul 2025 21:47)  POCT Blood Glucose.: 253 mg/dL (05 Jul 2025 17:10)      RADIOLOGY & ADDITIONAL TESTS:  < from: US Renal (07.03.25 @ 20:02) >  CC: [id number] EXAM:  US KIDNEY(S)   ORDERED BY: FRANK GARDNER     PROCEDURE DATE:  07/03/2025          INTERPRETATION:  CLINICAL INFORMATION: Renal cysts, acute kidney injury    COMPARISON: CT 7/10/2025    TECHNIQUE: Sonography of the kidneys and bladder.    FINDINGS:  Right kidney: 14.2 cm. No renal mass, hydronephrosis or calculi. Renal   cysts measure up to 4.2 cm    Left kidney: 12.3 cm. No renal mass, hydronephrosis or calculi. 6.8 cm   mid renal cyst    Urinary bladder: Within normal limits. Bilateral ureteral jets.  Pre-void volume: 65 ml  Post-void volume: 2 ml    Enlarged/52 cc prostate    IMPRESSION:  1.  No hydronephrosis.  2.  Enlarged/52 cc prostate    < end of copied text >  < from: US Duplex Venous Lower Ext Complete, Bilateral (07.05.25 @ 09:17) >  ACC: [id number] EXAM:  US DPLX LWR EXT VEINS COMPL BI   ORDERED BY: SHANON ESPINOZA     PROCEDURE DATE:  07/05/2025          INTERPRETATION:  CLINICAL INFORMATION: Elevated d-dimer.    COMPARISON: None available.    TECHNIQUE: Duplex sonography of the BILATERAL LOWER extremity veins with   color and spectral Doppler, with and without compression.    FINDINGS:    RIGHT:  Normal compressibility of the RIGHT common femoral, femoral and popliteal   veins.  Doppler examination shows normal spontaneousand phasic flow.  No RIGHT calf vein thrombosis is detected.    LEFT:  Normal compressibility of the LEFT common femoral, femoral and popliteal   veins.  Doppler examination shows normal spontaneous and phasic flow.  No LEFT calf vein thrombosis is detected.    IMPRESSION:  No evidence of deep venous thrombosis in either lower extremity.      < end of copied text >    Imaging Personally Reviewed:  [X ] YES  [ ] NO    Consultant(s) Notes Reviewed:  [X ] YES  [ ] NO    Care Discussed with Consultants/Other Providers [ X] YES  [ ] NO
HPI:  74-year-old male with past medical history of systolic CHF(last known EF 20%), hypertension, hyperlipidemia, type 2 diabetes, and BPH, ?prostate ca who presented to the ED due to shortness of breath for the last 5days. The patient lives in the [place] but son-in-law is a respiratory therapist in this hospital so decided to bring him in. Patient reports that it took him 20 minutes to get to the corner store( which usually takes five minutes) due to shortness of breath. States SOB is only with exertion. + orthopnea. The patient denies any fevers, chills, coughing, chest pain, GI/ symptoms, or other complaints.   On presentation, vital signs were stable, O2 sat 99% on room air, slightly tachypneic. Labs notable for D-dimer of 2282, troponin 95>.84, creatinine 2.4, BNP 10,156. The patient received Lasix and 500 cc bolus in the ED.  (03 Jul 2025 08:25)  Patient is a 74y old  Male who presents with a chief complaint of RIVAS likely due to CHF exacerbation (05 Jul 2025 18:02)      INTERVAL HPI/OVERNIGHT EVENTS: no acute events     MEDICATIONS  (STANDING):  atorvastatin 80 milliGRAM(s) Oral at bedtime  dextrose 5%. 1000 milliLiter(s) (50 mL/Hr) IV Continuous <Continuous>  dextrose 5%. 1000 milliLiter(s) (100 mL/Hr) IV Continuous <Continuous>  dextrose 50% Injectable 25 Gram(s) IV Push once  dextrose 50% Injectable 12.5 Gram(s) IV Push once  dextrose 50% Injectable 25 Gram(s) IV Push once  finasteride 5 milliGRAM(s) Oral daily  furosemide   Injectable 40 milliGRAM(s) IV Push every 12 hours  glucagon  Injectable 1 milliGRAM(s) IntraMuscular once  heparin   Injectable 5000 Unit(s) SubCutaneous every 12 hours  insulin glargine Injectable (LANTUS) 10 Unit(s) SubCutaneous at bedtime  insulin lispro (ADMELOG) corrective regimen sliding scale   SubCutaneous three times a day before meals  spironolactone 25 milliGRAM(s) Oral daily  tamsulosin 0.4 milliGRAM(s) Oral at bedtime    MEDICATIONS  (PRN):  acetaminophen     Tablet .. 650 milliGRAM(s) Oral every 6 hours PRN Temp greater or equal to 38C (100.4F), Mild Pain (1 - 3)  dextrose Oral Gel 15 Gram(s) Oral once PRN Blood Glucose LESS THAN 70 milliGRAM(s)/deciliter      Allergies    No Known Allergies    Intolerances        REVIEW OF SYSTEMS:  complains of occasional palpations     Vital Signs Last 24 Hrs  T(C): 36.4 (07 Jul 2025 04:56), Max: 36.5 (07 Jul 2025 00:04)  T(F): 97.5 (07 Jul 2025 04:56), Max: 97.7 (07 Jul 2025 00:04)  HR: 87 (07 Jul 2025 04:56) (61 - 96)  BP: 105/73 (07 Jul 2025 04:56) (92/64 - 105/73)  RR: 18 (07 Jul 2025 04:56) (18 - 18)  SpO2: 94% (07 Jul 2025 04:56) (94% - 100%)    Parameters below as of 06 Jul 2025 15:55  Patient On (Oxygen Delivery Method): room air        PHYSICAL EXAM:  GENERAL: NAD, well-groomed, well-developed  HEAD:  Atraumatic, Normocephalic  EYES: EOMI, PERRLA, conjunctiva and sclera clear  ENMT: No tonsillar erythema, exudates, or enlargement; Moist mucous membranes, Good dentition, No lesions  NECK: Supple, No JVD, Normal thyroid  NERVOUS SYSTEM:  Alert & Oriented X3, Good concentration; Motor Strength 5/5 B/L upper and lower extremities; DTRs 2+ intact and symmetric  CHEST/LUNG: Clear to ascultation  bilaterally; No rales, rhonchi, wheezing, or rubs  HEART: Regular rate and rhythm; No murmurs, rubs, or gallops  ABDOMEN: Soft, Nontender, Nondistended; Bowel sounds present  EXTREMITIES:  2+ Peripheral Pulses, No clubbing, cyanosis, or edema  SKIN: No rashes or lesions    LABS:              CAPILLARY BLOOD GLUCOSE      POCT Blood Glucose.: 237 mg/dL (06 Jul 2025 22:08)  POCT Blood Glucose.: 223 mg/dL (06 Jul 2025 17:12)  POCT Blood Glucose.: 251 mg/dL (06 Jul 2025 11:47)  POCT Blood Glucose.: 245 mg/dL (06 Jul 2025 07:29)      RADIOLOGY & ADDITIONAL TESTS:    Imaging Personally Reviewed:  [ ] YES  [ ] NO    Consultant(s) Notes Reviewed:  [ ] YES  [ ] NO    Care Discussed with Consultants/Other Providers [ ] YES  [ ] NO  
Patient is a 74y old  Male who presents with a chief complaint of RIVAS likely due to CHF exacerbation (10 Jul 2025 10:45)      INTERVAL HPI/OVERNIGHT EVENTS: No events overnight. was supposed to be DC yday however pharmacy closed.     MEDICATIONS  (STANDING):  apixaban 5 milliGRAM(s) Oral every 12 hours  atorvastatin 80 milliGRAM(s) Oral at bedtime  dextrose 5%. 1000 milliLiter(s) (50 mL/Hr) IV Continuous <Continuous>  dextrose 5%. 1000 milliLiter(s) (100 mL/Hr) IV Continuous <Continuous>  dextrose 50% Injectable 25 Gram(s) IV Push once  dextrose 50% Injectable 12.5 Gram(s) IV Push once  dextrose 50% Injectable 25 Gram(s) IV Push once  finasteride 5 milliGRAM(s) Oral daily  glucagon  Injectable 1 milliGRAM(s) IntraMuscular once  insulin glargine Injectable (LANTUS) 15 Unit(s) SubCutaneous at bedtime  insulin lispro (ADMELOG) corrective regimen sliding scale   SubCutaneous three times a day before meals  metoprolol tartrate 25 milliGRAM(s) Oral two times a day  polyethylene glycol 3350 17 Gram(s) Oral daily  spironolactone 25 milliGRAM(s) Oral daily  tamsulosin 0.4 milliGRAM(s) Oral at bedtime  torsemide 20 milliGRAM(s) Oral daily    MEDICATIONS  (PRN):  acetaminophen     Tablet .. 650 milliGRAM(s) Oral every 6 hours PRN Temp greater or equal to 38C (100.4F), Mild Pain (1 - 3)  dextrose Oral Gel 15 Gram(s) Oral once PRN Blood Glucose LESS THAN 70 milliGRAM(s)/deciliter      Allergies    No Known Allergies    Intolerances        REVIEW OF SYSTEMS:  ROS negative unless stated otherwise.    Vital Signs Last 24 Hrs  T(C): 36.8 (11 Jul 2025 11:01), Max: 36.9 (10 Jul 2025 23:40)  T(F): 98.3 (11 Jul 2025 11:01), Max: 98.4 (10 Jul 2025 23:40)  HR: 73 (11 Jul 2025 11:01) (67 - 88)  BP: 93/63 (11 Jul 2025 11:01) (93/63 - 115/70)  BP(mean): --  RR: 18 (11 Jul 2025 11:01) (18 - 18)  SpO2: 97% (11 Jul 2025 11:01) (97% - 98%)    Parameters below as of 11 Jul 2025 11:01  Patient On (Oxygen Delivery Method): room air        PHYSICAL EXAM:  GENERAL: NAD  HEAD:  Atraumatic   EYES: EOMI   ENMT: Moist mucous membranes  NECK: Supple   NERVOUS SYSTEM:  Awake  CHEST/LUNG: CTAB   HEART: RRR   ABDOMEN: Soft, Nontender, Nondistended  EXTREMITIES:   No clubbing, cyanosis, or edema  PSYCH: Mood appropriate    LABS:                        13.3   6.00  )-----------( 248      ( 11 Jul 2025 08:42 )             40.5     07-10    136  |  103  |  34[H]  ----------------------------<  137[H]  3.8   |  25  |  1.49[H]    Ca    9.3      10 Jul 2025 06:28  Mg     1.9     07-10      PTT - ( 09 Jul 2025 18:25 )  PTT:37.5 sec  Urinalysis Basic - ( 10 Jul 2025 06:28 )    Color: x / Appearance: x / SG: x / pH: x  Gluc: 137 mg/dL / Ketone: x  / Bili: x / Urobili: x   Blood: x / Protein: x / Nitrite: x   Leuk Esterase: x / RBC: x / WBC x   Sq Epi: x / Non Sq Epi: x / Bacteria: x      CAPILLARY BLOOD GLUCOSE      POCT Blood Glucose.: 181 mg/dL (11 Jul 2025 11:51)  POCT Blood Glucose.: 150 mg/dL (11 Jul 2025 08:08)  POCT Blood Glucose.: 184 mg/dL (10 Jul 2025 21:12)  POCT Blood Glucose.: 191 mg/dL (10 Jul 2025 16:35)      RADIOLOGY & ADDITIONAL TESTS:    Imaging Personally Reviewed:  [ X] YES  [ ] NO    Consultant(s) Notes Reviewed:  [ X] YES  [ ] NO    Care Discussed with Consultants/Other Providers [X ] YES  [ ] NO
HPI:  74-year-old male with past medical history of systolic CHF(last known EF 20%), hypertension, hyperlipidemia, type 2 diabetes, and BPH, ?prostate ca who presented to the ED due to shortness of breath for the last 5days. The patient lives in the [place] but son-in-law is a respiratory therapist in this hospital so decided to bring him in. Patient reports that it took him 20 minutes to get to the corner store( which usually takes five minutes) due to shortness of breath. States SOB is only with exertion. + orthopnea. The patient denies any fevers, chills, coughing, chest pain, GI/ symptoms, or other complaints.   On presentation, vital signs were stable, O2 sat 99% on room air, slightly tachypneic. Labs notable for D-dimer of 2282, troponin 95>.84, creatinine 2.4, BNP 10,156. The patient received Lasix and 500 cc bolus in the ED.  (03 Jul 2025 08:25)  Patient is a 74y old  Male who presents with a chief complaint of RIVAS likely due to CHF exacerbation (04 Jul 2025 08:02)      INTERVAL HPI/OVERNIGHT EVENTS: improved no complaints    MEDICATIONS  (STANDING):  atorvastatin 80 milliGRAM(s) Oral at bedtime  carvedilol 12.5 milliGRAM(s) Oral every 12 hours  dextrose 5%. 1000 milliLiter(s) (50 mL/Hr) IV Continuous <Continuous>  dextrose 5%. 1000 milliLiter(s) (100 mL/Hr) IV Continuous <Continuous>  dextrose 50% Injectable 25 Gram(s) IV Push once  dextrose 50% Injectable 12.5 Gram(s) IV Push once  dextrose 50% Injectable 25 Gram(s) IV Push once  finasteride 5 milliGRAM(s) Oral daily  furosemide   Injectable 40 milliGRAM(s) IV Push every 12 hours  glucagon  Injectable 1 milliGRAM(s) IntraMuscular once  heparin   Injectable 5000 Unit(s) SubCutaneous every 12 hours  insulin lispro (ADMELOG) corrective regimen sliding scale   SubCutaneous three times a day before meals  spironolactone 25 milliGRAM(s) Oral daily  tamsulosin 0.4 milliGRAM(s) Oral at bedtime    MEDICATIONS  (PRN):  acetaminophen     Tablet .. 650 milliGRAM(s) Oral every 6 hours PRN Temp greater or equal to 38C (100.4F), Mild Pain (1 - 3)  dextrose Oral Gel 15 Gram(s) Oral once PRN Blood Glucose LESS THAN 70 milliGRAM(s)/deciliter      Allergies    No Known Allergies    Intolerances        REVIEW OF SYSTEMS:  CONSTITUTIONAL: No fever, weight loss, or fatigue  EYES: No eye pain, visual disturbances, or discharge  ENMT:  No difficulty hearing, tinnitus, vertigo; No sinus or throat pain  NECK: No pain or stiffness  BREASTS: No pain, masses, or nipple discharge  RESPIRATORY: No cough, wheezing, chills or hemoptysis; No shortness of breath  CARDIOVASCULAR: No chest pain, palpitations, dizziness, or leg swelling  GASTROINTESTINAL: No abdominal or epigastric pain. No nausea, vomiting, or hematemesis; No diarrhea or constipation. No melena or hematochezia.  GENITOURINARY: No dysuria, frequency, hematuria, or incontinence  NEUROLOGICAL: No headaches, memory loss, loss of strength, numbness, or tremors  SKIN: No itching, burning, rashes, or lesions   LYMPH NODES: No enlarged glands  ENDOCRINE: No heat or cold intolerance; No hair loss  MUSCULOSKELETAL: No joint pain or swelling; No muscle, back, or extremity pain  PSYCHIATRIC: No depression, anxiety, mood swings, or difficulty sleeping  HEME/LYMPH: No easy bruising, or bleeding gums  ALLERGY AND IMMUNOLOGIC: No hives or eczema    Vital Signs Last 24 Hrs  T(C): 36.6 (04 Jul 2025 11:38), Max: 36.6 (04 Jul 2025 11:38)  T(F): 97.9 (04 Jul 2025 11:38), Max: 97.9 (04 Jul 2025 11:38)  HR: 75 (04 Jul 2025 13:28) (73 - 89)  BP: 95/64 (04 Jul 2025 13:28) (85/60 - 107/77)  BP(mean): 74 (04 Jul 2025 13:28) (74 - 87)  RR: 19 (04 Jul 2025 11:38) (18 - 19)  SpO2: 96% (04 Jul 2025 11:38) (92% - 99%)        PHYSICAL EXAM:  GENERAL: NAD, well-groomed, well-developed  HEAD:  Atraumatic, Normocephalic  EYES: EOMI, PERRLA, conjunctiva and sclera clear  ENMT: No tonsillar erythema, exudates, or enlargement; Moist mucous membranes, Good dentition, No lesions  NECK: Supple, No JVD, Normal thyroid  NERVOUS SYSTEM:  Alert & Oriented X3, Good concentration; Motor Strength 5/5 B/L upper and lower extremities; DTRs 2+ intact and symmetric  CHEST/LUNG: Clear to ascultation  bilaterally; No rales, rhonchi, wheezing, or rubs  HEART: Regular rate and rhythm; No murmurs, rubs, or gallops  ABDOMEN: Soft, Nontender, Nondistended; Bowel sounds present  EXTREMITIES:  2+ Peripheral Pulses, No clubbing, cyanosis, or edema  LYMPH: No lymphadenopathy noted  SKIN: No rashes or lesions    LABS:                        13.9   7.17  )-----------( 230      ( 03 Jul 2025 10:10 )             43.7     07-04    136  |  103  |  42[H]  ----------------------------<  189[H]  3.3[L]   |  19[L]  |  1.57[H]    Ca    9.2      04 Jul 2025 06:47    TPro  7.6  /  Alb  3.6  /  TBili  1.0  /  DBili  x   /  AST  15  /  ALT  26  /  AlkPhos  116  07-03      Urinalysis Basic - ( 04 Jul 2025 06:47 )    Color: x / Appearance: x / SG: x / pH: x  Gluc: 189 mg/dL / Ketone: x  / Bili: x / Urobili: x   Blood: x / Protein: x / Nitrite: x   Leuk Esterase: x / RBC: x / WBC x   Sq Epi: x / Non Sq Epi: x / Bacteria: x      CAPILLARY BLOOD GLUCOSE      POCT Blood Glucose.: 249 mg/dL (04 Jul 2025 11:01)  POCT Blood Glucose.: 191 mg/dL (04 Jul 2025 08:09)  POCT Blood Glucose.: 198 mg/dL (03 Jul 2025 21:53)  POCT Blood Glucose.: 262 mg/dL (03 Jul 2025 16:08)      RADIOLOGY & ADDITIONAL TESTS:    Imaging Personally Reviewed:  [ ] YES  [ ] NO    Consultant(s) Notes Reviewed:  [ ] YES  [ ] NO    Care Discussed with Consultants/Other Providers [ ] YES  [ ] NO
HPI:  74-year-old male with past medical history of systolic CHF(last known EF 20%), hypertension, hyperlipidemia, type 2 diabetes, and BPH, ?prostate ca who presented to the ED due to shortness of breath for the last 5days. The patient lives in the [place] but son-in-law is a respiratory therapist in this hospital so decided to bring him in. Patient reports that it took him 20 minutes to get to the corner store( which usually takes five minutes) due to shortness of breath. States SOB is only with exertion. + orthopnea. The patient denies any fevers, chills, coughing, chest pain, GI/ symptoms, or other complaints.   On presentation, vital signs were stable, O2 sat 99% on room air, slightly tachypneic. Labs notable for D-dimer of 2282, troponin 95>.84, creatinine 2.4, BNP 10,156. The patient received Lasix and 500 cc bolus in the ED.  (03 Jul 2025 08:25)  Patient is a 74y old  Male who presents with a chief complaint of RIVAS likely due to CHF exacerbation (07 Jul 2025 21:40)      INTERVAL HPI/OVERNIGHT EVENTS: no acute overnight events     MEDICATIONS  (STANDING):  atorvastatin 80 milliGRAM(s) Oral at bedtime  dextrose 5%. 1000 milliLiter(s) (50 mL/Hr) IV Continuous <Continuous>  dextrose 5%. 1000 milliLiter(s) (100 mL/Hr) IV Continuous <Continuous>  dextrose 50% Injectable 25 Gram(s) IV Push once  dextrose 50% Injectable 12.5 Gram(s) IV Push once  dextrose 50% Injectable 25 Gram(s) IV Push once  finasteride 5 milliGRAM(s) Oral daily  furosemide    Tablet 80 milliGRAM(s) Oral every 12 hours  glucagon  Injectable 1 milliGRAM(s) IntraMuscular once  heparin  Infusion. 1500 Unit(s)/Hr (15 mL/Hr) IV Continuous <Continuous>  insulin glargine Injectable (LANTUS) 15 Unit(s) SubCutaneous at bedtime  insulin lispro (ADMELOG) corrective regimen sliding scale   SubCutaneous three times a day before meals  spironolactone 25 milliGRAM(s) Oral daily  tamsulosin 0.4 milliGRAM(s) Oral at bedtime    MEDICATIONS  (PRN):  acetaminophen     Tablet .. 650 milliGRAM(s) Oral every 6 hours PRN Temp greater or equal to 38C (100.4F), Mild Pain (1 - 3)  dextrose Oral Gel 15 Gram(s) Oral once PRN Blood Glucose LESS THAN 70 milliGRAM(s)/deciliter  heparin   Injectable 8000 Unit(s) IV Push every 6 hours PRN For aPTT less than 40  heparin   Injectable 4000 Unit(s) IV Push every 6 hours PRN For aPTT between 40 - 57      Allergies    No Known Allergies    Intolerances        REVIEW OF SYSTEMS:  no new complaints     Vital Signs Last 24 Hrs  T(C): 36.2 (08 Jul 2025 04:38), Max: 36.5 (07 Jul 2025 17:42)  T(F): 97.2 (08 Jul 2025 04:38), Max: 97.7 (07 Jul 2025 17:42)  HR: 86 (08 Jul 2025 04:38) (80 - 92)  BP: 126/77 (08 Jul 2025 04:38) (101/76 - 126/77)  BP(mean): 87 (07 Jul 2025 18:27) (87 - 87)  RR: 18 (08 Jul 2025 04:38) (16 - 18)  SpO2: 93% (08 Jul 2025 04:38) (93% - 94%)    Parameters below as of 07 Jul 2025 10:47  Patient On (Oxygen Delivery Method): room air        PHYSICAL EXAM:  GENERAL: NAD, well-groomed, well-developed  HEAD:  Atraumatic, Normocephalic  EYES: EOMI, PERRLA, conjunctiva and sclera clear  ENMT: No tonsillar erythema, exudates, or enlargement; Moist mucous membranes, Good dentition, No lesions  NECK: Supple, No JVD, Normal thyroid  NERVOUS SYSTEM:  Alert & Oriented X3, Good concentration; Motor Strength 5/5 B/L upper and lower extremities; DTRs 2+ intact and symmetric  CHEST/LUNG: Clear to ascultation  bilaterally; No rales, rhonchi, wheezing, or rubs  HEART: Regular rate and rhythm; No murmurs, rubs, or gallops  ABDOMEN: Soft, Nontender, Nondistended; Bowel sounds present  EXTREMITIES:  2+ Peripheral Pulses, No clubbing, cyanosis, or edema  LYMPH: No lymphadenopathy noted  SKIN: No rashes or lesions    LABS:                        13.7   5.94  )-----------( 232      ( 07 Jul 2025 06:30 )             42.7     07-07    137  |  102  |  40[H]  ----------------------------<  237[H]  3.7   |  25  |  1.67[H]    Ca    10.0      07 Jul 2025 06:30  Phos  4.1     07-07  Mg     1.7     07-07      PT/INR - ( 07 Jul 2025 19:20 )   PT: 14.8 sec;   INR: 1.28 ratio         PTT - ( 07 Jul 2025 21:10 )  PTT:>200.0 sec  Urinalysis Basic - ( 07 Jul 2025 06:30 )    Color: x / Appearance: x / SG: x / pH: x  Gluc: 237 mg/dL / Ketone: x  / Bili: x / Urobili: x   Blood: x / Protein: x / Nitrite: x   Leuk Esterase: x / RBC: x / WBC x   Sq Epi: x / Non Sq Epi: x / Bacteria: x      CAPILLARY BLOOD GLUCOSE      POCT Blood Glucose.: 153 mg/dL (08 Jul 2025 07:39)  POCT Blood Glucose.: 174 mg/dL (07 Jul 2025 21:41)  POCT Blood Glucose.: 269 mg/dL (07 Jul 2025 16:29)  POCT Blood Glucose.: 211 mg/dL (07 Jul 2025 11:03)      RADIOLOGY & ADDITIONAL TESTS:    Imaging Personally Reviewed:  [ ] YES  [ ] NO    Consultant(s) Notes Reviewed:  [ ] YES  [ ] NO    Care Discussed with Consultants/Other Providers [ ] YES  [ ] NO

## 2025-07-11 NOTE — PROGRESS NOTE ADULT - PROVIDER SPECIALTY LIST ADULT
Cardiology
Nephrology
Cardiology
Nephrology
Hospitalist

## 2025-07-11 NOTE — PROGRESS NOTE ADULT - PROBLEM SELECTOR PROBLEM 4
ZAHEER (acute kidney injury)

## 2025-07-11 NOTE — PROGRESS NOTE ADULT - REASON FOR ADMISSION
RIVAS likely due to CHF exacerbation

## 2025-07-11 NOTE — PROGRESS NOTE ADULT - PROBLEM SELECTOR PLAN 3
- d- dimer 2282  - no CTA in ED 2/2 ZAHEER  -  VQ scan low probability  negative DVT study
- d- dimer 2282  - no CTA in ED 2/2 ZAHEER  -  VQ scan low probability  follow up deep vein thrombosis study
- d- dimer 2282  - no CTA in ED 2/2 ZAHEER  -  VQ scan low probability  negative DVT study

## 2025-07-11 NOTE — PROGRESS NOTE ADULT - PROBLEM SELECTOR PLAN 5
- Home meds: jardiance and glipizide  - start  ISS, POC BG achs>> calculate basal bolus based on need  - hypoglycemia protocol in place  - carb control diet  -  A1c 10 plus   will start lantus 10 QHS
- Home meds: jardiance and glipizide  - start  ISS, POC BG achs>> calculate basal bolus based on need  - hypoglycemia protocol in place  - carb control diet  -  A1c 10 plus may need insulin
- Home meds: jardiance and glipizide  - start  ISS, POC BG achs>> calculate basal bolus based on need  - hypoglycemia protocol in place  - carb control diet  -  A1c 10 plus   on Lantus
- Home meds: jardiance and glipizide  - start  ISS, POC BG achs>> calculate basal bolus based on need  - hypoglycemia protocol in place  - carb control diet  -  A1c 10 plus   will start lantus 10 QHS
- Home meds: jardiance and glipizide  - start  ISS, POC BG achs>> calculate basal bolus based on need  - hypoglycemia protocol in place  - carb control diet  -  A1c 10 plus   on Lantus

## 2025-07-11 NOTE — PROGRESS NOTE ADULT - PROBLEM SELECTOR PLAN 6
- pt denies hx (poor historian)  - documented in [facility] ?with bone mets  - obtain collateral info in am
- pt denies hx (poor historian)  - documented in [facility] ?with bone mets  - obtain collateral info in am  -US with enlarged prostate
- pt denies hx (poor historian)  - documented in [facility] ?with bone mets  - obtain collateral info in am  -US with enlarged prostate
- pt denies hx (poor historian)  - documented in [facility] ?with bone mets  - obtain collateral info in am  -US with enlarged prostate
no history of only BPH   -US with enlarged prostate
- pt denies hx (poor historian)  - documented in [facility] ?with bone mets  - obtain collateral info in am  -US with enlarged prostate
no history of only BPH   -US with enlarged prostate

## 2025-07-11 NOTE — PROGRESS NOTE ADULT - PROBLEM SELECTOR PLAN 4
- Cr elevated on admission   - Possibly prerenal in the setting of CHF  - c/w diuresis  - Avoid nephrotoxin  - i/os  - appreciate nephrology  -improved
- Cr elevated on admission   - Possibly prerenal in the setting of CHF  - c/w diuresis  - Avoid nephrotoxin  - i/os  - f/u am labs  - appreciate nephrology

## 2025-07-11 NOTE — PROGRESS NOTE ADULT - ASSESSMENT
74-year-old male with past medical history of systolic CHF(last known EF 20%), hypertension, hyperlipidemia, type 2 diabetes, and BPH no prostate cancer who presented to the ED due to shortness of breath for the last 5days. Found to be in CHF exacerbation,     cardiac thrombus on TTE   Cardiology consulted now on doac   follow up in 3 months for AICD with cardiology in the [place]   may need home insulin Lantus at discharge
74M HTN, HLD, DM, NICM EF 20%, BPH who presented with acute on chronic CHF exacerbation, noted to have new LV thrombus.    -heparin gtt to DOAC  -current BP has precluded uptitration on GDMT; will add low dose metoprolol  - keep k > 4 Mg >2  -euvolemic on exam, currently continued on Lasix 80 mg bid as per renal  -will need at least three months of AC; given new LV thrombus despite acute event and known NICM, favor long term AC as tolerated  -outpt ICD eval
74M HTN, HLD, DM, NICM EF 20%, hx of prostate ca who presented with acute on chronic CHF exacerbation, noted to have new LV thrombus.    -heparin gtt to coumadin bridge, goal INR 2.5  -current BP has precluded uptitration on GDMT; trial low dose metoprolol  -euvolemic on exam and can likely transition to PO - increase outpt maintenance to torsemide 20mg BID  -will need at least three months of AC; given new LV thrombus despite acute event and known NICM, favor long term AC as tolerated  -outpt ICD eval
74-year-old male with past medical history of systolic CHF(last known EF 20%), hypertension, hyperlipidemia, type 2 diabetes, and BPH, ?prostate ca who presented to the ED due to shortness of breath for the last 5days. Found to be in CHF exacerbation,    New  cardiac thrombus on TTE   Cardiology consulted start heparin  drip will see again 7/8   given VTACH  may benefit from more urgent referral for AICD   coumadin vs doac?
74-year-old male with past medical history of systolic CHF(last known EF 20%), hypertension, hyperlipidemia, type 2 diabetes, and BPH, ?prostate ca who presented to the ED due to shortness of breath for the last 5days. Found to be in CHF exacerbation,      Improved case discussed with Daughter    may benefit from nighttime Lantus endocrine consult   will get TTE  given VTACH  may benefit from more urgent referral for AICD need Cardiology again 7/7 
74-year-old male with past medical history of systolic CHF(last known EF 20%), hypertension, hyperlipidemia, type 2 diabetes, and BPH no prostate cancer who presented to the ED due to shortness of breath for the last 5days. Found to be in CHF exacerbation,    
74-year-old male with past medical history of systolic CHF(last known EF 20%), hypertension, hyperlipidemia, type 2 diabetes, and BPH, ?prostate ca who presented to the ED due to shortness of breath for the last 5days. Found to be in CHF exacerbation,    New  cardiac thrombus on TTE   Cardiology consulted start heparin  drip will see again 7/8   given VTACH  may benefit from more urgent referral for AICD   coumadin vs doac?
74-year-old male with past medical history of systolic CHF(last known EF 20%), hypertension, hyperlipidemia, type 2 diabetes, and BPH, ?prostate ca who presented to the ED due to shortness of breath for the last 5days. Found to be in CHF exacerbation,      Improved case discussed with Daughter  
74-year-old male with past medical history of systolic CHF(last known EF 20%), hypertension, hyperlipidemia, type 2 diabetes, and BPH, ?prostate ca who presented to the ED due to shortness of breath for the last 5days. Found to be in CHF exacerbation,      Improved case discussed with Daughter    may benefit from nighttime lantus endocrine consult   will gett TTE foir our records   given VTACH  may benefit from more urgenbt referral for AICD

## 2025-07-13 RX ORDER — TAMSULOSIN HYDROCHLORIDE 0.4 MG/1
1 CAPSULE ORAL
Qty: 30 | Refills: 0
Start: 2025-07-13 | End: 2025-08-11

## 2025-07-13 RX ORDER — INSULIN GLARGINE-YFGN 100 [IU]/ML
15 INJECTION, SOLUTION SUBCUTANEOUS
Qty: 1 | Refills: 0
Start: 2025-07-13 | End: 2025-08-11

## 2025-07-13 RX ORDER — INSULIN GLARGINE-YFGN 100 [IU]/ML
15 INJECTION, SOLUTION SUBCUTANEOUS
Qty: 5 | Refills: 0
Start: 2025-07-13 | End: 2025-08-11

## 2025-07-13 RX ORDER — FINASTERIDE 1 MG/1
1 TABLET, FILM COATED ORAL
Qty: 30 | Refills: 0
Start: 2025-07-13 | End: 2025-08-11

## 2025-07-13 RX ORDER — EMPAGLIFLOZIN 25 MG/1
1 TABLET, FILM COATED ORAL
Qty: 30 | Refills: 0
Start: 2025-07-13 | End: 2025-08-11

## 2025-07-14 RX ORDER — ISOPROPYL ALCOHOL, BENZOCAINE .7; .06 ML/ML; ML/ML
0 SWAB TOPICAL
Qty: 100 | Refills: 1
Start: 2025-07-14

## 2025-07-17 DIAGNOSIS — E11.22 TYPE 2 DIABETES MELLITUS WITH DIABETIC CHRONIC KIDNEY DISEASE: ICD-10-CM

## 2025-07-17 DIAGNOSIS — R79.89 OTHER SPECIFIED ABNORMAL FINDINGS OF BLOOD CHEMISTRY: ICD-10-CM

## 2025-07-17 DIAGNOSIS — N17.9 ACUTE KIDNEY FAILURE, UNSPECIFIED: ICD-10-CM

## 2025-07-17 DIAGNOSIS — Z79.84 LONG TERM (CURRENT) USE OF ORAL HYPOGLYCEMIC DRUGS: ICD-10-CM

## 2025-07-17 DIAGNOSIS — Z85.46 PERSONAL HISTORY OF MALIGNANT NEOPLASM OF PROSTATE: ICD-10-CM

## 2025-07-17 DIAGNOSIS — I24.89 OTHER FORMS OF ACUTE ISCHEMIC HEART DISEASE: ICD-10-CM

## 2025-07-17 DIAGNOSIS — R06.02 SHORTNESS OF BREATH: ICD-10-CM

## 2025-07-17 DIAGNOSIS — I50.23 ACUTE ON CHRONIC SYSTOLIC (CONGESTIVE) HEART FAILURE: ICD-10-CM

## 2025-07-17 DIAGNOSIS — E78.5 HYPERLIPIDEMIA, UNSPECIFIED: ICD-10-CM

## 2025-07-17 DIAGNOSIS — I51.3 INTRACARDIAC THROMBOSIS, NOT ELSEWHERE CLASSIFIED: ICD-10-CM

## 2025-07-17 DIAGNOSIS — N40.0 BENIGN PROSTATIC HYPERPLASIA WITHOUT LOWER URINARY TRACT SYMPTOMS: ICD-10-CM

## 2025-07-17 DIAGNOSIS — I42.8 OTHER CARDIOMYOPATHIES: ICD-10-CM

## 2025-07-18 ENCOUNTER — APPOINTMENT (OUTPATIENT)
Dept: CARDIOLOGY | Facility: CLINIC | Age: 75
End: 2025-07-18

## 2025-07-18 ENCOUNTER — NON-APPOINTMENT (OUTPATIENT)
Age: 75
End: 2025-07-18

## 2025-07-18 VITALS
SYSTOLIC BLOOD PRESSURE: 110 MMHG | DIASTOLIC BLOOD PRESSURE: 62 MMHG | HEIGHT: 68 IN | BODY MASS INDEX: 30.46 KG/M2 | OXYGEN SATURATION: 98 % | HEART RATE: 64 BPM | WEIGHT: 201 LBS

## 2025-07-18 PROCEDURE — 99214 OFFICE O/P EST MOD 30 MIN: CPT

## 2025-07-18 PROCEDURE — 93000 ELECTROCARDIOGRAM COMPLETE: CPT

## 2025-07-18 PROCEDURE — 93306 TTE W/DOPPLER COMPLETE: CPT

## 2025-07-18 RX ORDER — TORSEMIDE 20 MG/1
20 TABLET ORAL
Qty: 180 | Refills: 0 | Status: ACTIVE | COMMUNITY
Start: 2025-07-18 | End: 1900-01-01

## 2025-07-22 LAB
ALBUMIN SERPL ELPH-MCNC: 4.9 G/DL
ALP BLD-CCNC: 82 U/L
ALT SERPL-CCNC: 17 U/L
ANION GAP SERPL CALC-SCNC: 20 MMOL/L
AST SERPL-CCNC: 24 U/L
BASOPHILS # BLD AUTO: 0.02 K/UL
BASOPHILS NFR BLD AUTO: 0.4 %
BILIRUB SERPL-MCNC: 0.9 MG/DL
BUN SERPL-MCNC: 40 MG/DL
CALCIUM SERPL-MCNC: 11.2 MG/DL
CHLORIDE SERPL-SCNC: 98 MMOL/L
CHOLEST SERPL-MCNC: 88 MG/DL
CO2 SERPL-SCNC: 21 MMOL/L
CREAT SERPL-MCNC: 1.94 MG/DL
EGFRCR SERPLBLD CKD-EPI 2021: 36 ML/MIN/1.73M2
EOSINOPHIL # BLD AUTO: 0.21 K/UL
EOSINOPHIL NFR BLD AUTO: 4 %
ESTIMATED AVERAGE GLUCOSE: 237 MG/DL
GLUCOSE SERPL-MCNC: 75 MG/DL
HBA1C MFR BLD HPLC: 9.9 %
HCT VFR BLD CALC: 44.8 %
HDLC SERPL-MCNC: 34 MG/DL
HGB BLD-MCNC: 14 G/DL
IMM GRANULOCYTES NFR BLD AUTO: 0.2 %
LDLC SERPL-MCNC: 34 MG/DL
LYMPHOCYTES # BLD AUTO: 1.31 K/UL
LYMPHOCYTES NFR BLD AUTO: 25 %
MAN DIFF?: NORMAL
MCHC RBC-ENTMCNC: 28.6 PG
MCHC RBC-ENTMCNC: 31.3 G/DL
MCV RBC AUTO: 91.6 FL
MONOCYTES # BLD AUTO: 0.44 K/UL
MONOCYTES NFR BLD AUTO: 8.4 %
NEUTROPHILS # BLD AUTO: 3.24 K/UL
NEUTROPHILS NFR BLD AUTO: 62 %
NONHDLC SERPL-MCNC: 54 MG/DL
PLATELET # BLD AUTO: 310 K/UL
POTASSIUM SERPL-SCNC: 4.4 MMOL/L
PROT SERPL-MCNC: 8.2 G/DL
PSA FREE FLD-MCNC: 26 %
PSA FREE SERPL-MCNC: 0.21 NG/ML
PSA SERPL-MCNC: 0.79 NG/ML
RBC # BLD: 4.89 M/UL
RBC # FLD: 15.9 %
SODIUM SERPL-SCNC: 138 MMOL/L
TRIGL SERPL-MCNC: 102 MG/DL
TSH SERPL-ACNC: 2.8 UIU/ML
WBC # FLD AUTO: 5.23 K/UL

## 2025-07-25 ENCOUNTER — APPOINTMENT (OUTPATIENT)
Dept: CARDIOLOGY | Facility: CLINIC | Age: 75
End: 2025-07-25
Payer: MEDICARE

## 2025-07-25 VITALS
DIASTOLIC BLOOD PRESSURE: 70 MMHG | HEART RATE: 90 BPM | SYSTOLIC BLOOD PRESSURE: 110 MMHG | HEIGHT: 68 IN | WEIGHT: 199 LBS | OXYGEN SATURATION: 97 % | BODY MASS INDEX: 30.16 KG/M2

## 2025-07-25 PROCEDURE — 99214 OFFICE O/P EST MOD 30 MIN: CPT

## 2025-08-08 ENCOUNTER — APPOINTMENT (OUTPATIENT)
Dept: CARDIOLOGY | Facility: CLINIC | Age: 75
End: 2025-08-08
Payer: MEDICARE

## 2025-08-08 VITALS
OXYGEN SATURATION: 99 % | HEIGHT: 68 IN | WEIGHT: 199 LBS | SYSTOLIC BLOOD PRESSURE: 90 MMHG | BODY MASS INDEX: 30.16 KG/M2 | HEART RATE: 75 BPM | DIASTOLIC BLOOD PRESSURE: 62 MMHG

## 2025-08-08 VITALS — DIASTOLIC BLOOD PRESSURE: 58 MMHG | SYSTOLIC BLOOD PRESSURE: 86 MMHG

## 2025-08-08 PROCEDURE — 99214 OFFICE O/P EST MOD 30 MIN: CPT

## 2025-08-08 RX ORDER — SACUBITRIL AND VALSARTAN 97; 103 MG/1; MG/1
97-103 TABLET, FILM COATED ORAL TWICE DAILY
Qty: 180 | Refills: 3 | Status: ACTIVE | COMMUNITY
Start: 2025-08-08 | End: 1900-01-01

## 2025-08-10 LAB
ALBUMIN SERPL ELPH-MCNC: 4.5 G/DL
ALP BLD-CCNC: 73 U/L
ALT SERPL-CCNC: 15 U/L
ANION GAP SERPL CALC-SCNC: 19 MMOL/L
AST SERPL-CCNC: 21 U/L
BILIRUB SERPL-MCNC: 0.4 MG/DL
BUN SERPL-MCNC: 92 MG/DL
CALCIUM SERPL-MCNC: 10.8 MG/DL
CHLORIDE SERPL-SCNC: 100 MMOL/L
CO2 SERPL-SCNC: 19 MMOL/L
CREAT SERPL-MCNC: 2.43 MG/DL
EGFRCR SERPLBLD CKD-EPI 2021: 27 ML/MIN/1.73M2
GLUCOSE SERPL-MCNC: 70 MG/DL
HCT VFR BLD CALC: 44.5 %
HGB BLD-MCNC: 14.1 G/DL
MCHC RBC-ENTMCNC: 29.5 PG
MCHC RBC-ENTMCNC: 31.7 G/DL
MCV RBC AUTO: 93.1 FL
PLATELET # BLD AUTO: 279 K/UL
POTASSIUM SERPL-SCNC: 4.6 MMOL/L
PROT SERPL-MCNC: 7.9 G/DL
RBC # BLD: 4.78 M/UL
RBC # FLD: 15.9 %
SODIUM SERPL-SCNC: 139 MMOL/L
WBC # FLD AUTO: 4.27 K/UL

## 2025-08-20 ENCOUNTER — APPOINTMENT (OUTPATIENT)
Dept: FAMILY MEDICINE | Facility: CLINIC | Age: 75
End: 2025-08-20
Payer: MEDICARE

## 2025-08-20 VITALS
SYSTOLIC BLOOD PRESSURE: 118 MMHG | WEIGHT: 201.8 LBS | BODY MASS INDEX: 30.58 KG/M2 | DIASTOLIC BLOOD PRESSURE: 60 MMHG | HEIGHT: 68 IN | TEMPERATURE: 97.8 F | OXYGEN SATURATION: 100 % | HEART RATE: 92 BPM

## 2025-08-20 DIAGNOSIS — N18.32 TYPE 2 DIABETES MELLITUS WITH DIABETIC CHRONIC KIDNEY DISEASE: ICD-10-CM

## 2025-08-20 DIAGNOSIS — I50.9 HEART FAILURE, UNSPECIFIED: ICD-10-CM

## 2025-08-20 DIAGNOSIS — E11.22 TYPE 2 DIABETES MELLITUS WITH DIABETIC CHRONIC KIDNEY DISEASE: ICD-10-CM

## 2025-08-20 DIAGNOSIS — Z00.00 ENCOUNTER FOR GENERAL ADULT MEDICAL EXAMINATION W/OUT ABNORMAL FINDINGS: ICD-10-CM

## 2025-08-20 DIAGNOSIS — E11.9 TYPE 2 DIABETES MELLITUS W/OUT COMPLICATIONS: ICD-10-CM

## 2025-08-20 DIAGNOSIS — M10.9 GOUT, UNSPECIFIED: ICD-10-CM

## 2025-08-20 DIAGNOSIS — N40.0 BENIGN PROSTATIC HYPERPLASIA WITHOUT LOWER URINARY TRACT SYMPMS: ICD-10-CM

## 2025-08-20 DIAGNOSIS — C61 MALIGNANT NEOPLASM OF PROSTATE: ICD-10-CM

## 2025-08-20 PROCEDURE — G0439: CPT

## 2025-08-20 PROCEDURE — 99204 OFFICE O/P NEW MOD 45 MIN: CPT | Mod: 25

## 2025-08-20 PROCEDURE — 36415 COLL VENOUS BLD VENIPUNCTURE: CPT

## 2025-08-20 PROCEDURE — G2211 COMPLEX E/M VISIT ADD ON: CPT

## 2025-08-20 RX ORDER — METOPROLOL SUCCINATE 25 MG/1
25 TABLET, EXTENDED RELEASE ORAL
Qty: 90 | Refills: 2 | Status: ACTIVE | COMMUNITY
Start: 2025-08-20 | End: 1900-01-01

## 2025-08-20 RX ORDER — BLOOD-GLUCOSE CONTROL, NORMAL
EACH MISCELLANEOUS
Qty: 90 | Refills: 0 | Status: ACTIVE | COMMUNITY
Start: 2025-08-20 | End: 1900-01-01

## 2025-08-20 RX ORDER — BLOOD SUGAR DIAGNOSTIC
STRIP MISCELLANEOUS
Qty: 2 | Refills: 3 | Status: ACTIVE | COMMUNITY
Start: 2025-08-20 | End: 1900-01-01

## 2025-08-20 RX ORDER — BLOOD-GLUCOSE METER
W/DEVICE EACH MISCELLANEOUS
Qty: 1 | Refills: 0 | Status: ACTIVE | COMMUNITY
Start: 2025-08-20 | End: 1900-01-01

## 2025-08-20 RX ORDER — APIXABAN 5 MG/1
5 TABLET, FILM COATED ORAL
Qty: 60 | Refills: 2 | Status: ACTIVE | COMMUNITY
Start: 2025-08-20 | End: 1900-01-01

## 2025-08-21 ENCOUNTER — APPOINTMENT (OUTPATIENT)
Dept: ENDOCRINOLOGY | Facility: CLINIC | Age: 75
End: 2025-08-21

## 2025-08-22 PROBLEM — N40.0 BPH (BENIGN PROSTATIC HYPERPLASIA): Status: ACTIVE | Noted: 2025-08-20

## 2025-08-22 PROBLEM — E11.22 TYPE 2 DIABETES MELLITUS WITH STAGE 3B CHRONIC KIDNEY DISEASE: Status: ACTIVE | Noted: 2025-08-22

## 2025-08-25 DIAGNOSIS — I50.20 UNSPECIFIED SYSTOLIC (CONGESTIVE) HEART FAILURE: ICD-10-CM

## 2025-08-25 DIAGNOSIS — I24.0 ACUTE CORONARY THROMBOSIS NOT RESULTING IN MYOCARDIAL INFARCTION: ICD-10-CM

## 2025-08-25 PROBLEM — N18.32 STAGE 3B CHRONIC KIDNEY DISEASE: Status: ACTIVE | Noted: 2025-08-25

## 2025-08-25 LAB
ALBUMIN, RANDOM URINE: 4.4 MG/DL
CREAT SPEC-SCNC: 123 MG/DL
MICROALBUMIN/CREAT 24H UR-RTO: 36 MG/G

## 2025-08-25 RX ORDER — APIXABAN 5 MG/1
5 TABLET, FILM COATED ORAL
Qty: 180 | Refills: 3 | Status: ACTIVE | COMMUNITY
Start: 2025-08-25 | End: 1900-01-01

## 2025-08-25 RX ORDER — SACUBITRIL AND VALSARTAN 49; 51 MG/1; MG/1
49-51 TABLET, FILM COATED ORAL TWICE DAILY
Qty: 180 | Refills: 3 | Status: ACTIVE | COMMUNITY
Start: 2025-08-25 | End: 1900-01-01

## 2025-09-04 ENCOUNTER — APPOINTMENT (OUTPATIENT)
Dept: NEPHROLOGY | Facility: CLINIC | Age: 75
End: 2025-09-04
Payer: MEDICARE

## 2025-09-04 ENCOUNTER — APPOINTMENT (OUTPATIENT)
Dept: ENDOCRINOLOGY | Facility: CLINIC | Age: 75
End: 2025-09-04
Payer: MEDICARE

## 2025-09-04 VITALS
BODY MASS INDEX: 31.22 KG/M2 | DIASTOLIC BLOOD PRESSURE: 50 MMHG | HEIGHT: 68 IN | HEART RATE: 67 BPM | OXYGEN SATURATION: 95 % | WEIGHT: 206 LBS | SYSTOLIC BLOOD PRESSURE: 80 MMHG

## 2025-09-04 VITALS — SYSTOLIC BLOOD PRESSURE: 74 MMHG | DIASTOLIC BLOOD PRESSURE: 40 MMHG

## 2025-09-04 VITALS
HEIGHT: 68 IN | WEIGHT: 206 LBS | HEART RATE: 54 BPM | OXYGEN SATURATION: 95 % | BODY MASS INDEX: 31.22 KG/M2 | DIASTOLIC BLOOD PRESSURE: 52 MMHG | SYSTOLIC BLOOD PRESSURE: 94 MMHG

## 2025-09-04 VITALS — HEART RATE: 48 BPM | DIASTOLIC BLOOD PRESSURE: 50 MMHG | SYSTOLIC BLOOD PRESSURE: 84 MMHG

## 2025-09-04 DIAGNOSIS — E66.09 OBESITY, CLASS 1: ICD-10-CM

## 2025-09-04 DIAGNOSIS — E78.5 HYPERLIPIDEMIA, UNSPECIFIED: ICD-10-CM

## 2025-09-04 DIAGNOSIS — I10 ESSENTIAL (PRIMARY) HYPERTENSION: ICD-10-CM

## 2025-09-04 DIAGNOSIS — E11.22 TYPE 2 DIABETES MELLITUS WITH DIABETIC CHRONIC KIDNEY DISEASE: ICD-10-CM

## 2025-09-04 DIAGNOSIS — E66.811 OBESITY, CLASS 1: ICD-10-CM

## 2025-09-04 DIAGNOSIS — N18.32 TYPE 2 DIABETES MELLITUS WITH DIABETIC CHRONIC KIDNEY DISEASE: ICD-10-CM

## 2025-09-04 DIAGNOSIS — C61 MALIGNANT NEOPLASM OF PROSTATE: ICD-10-CM

## 2025-09-04 DIAGNOSIS — E83.52 HYPERCALCEMIA: ICD-10-CM

## 2025-09-04 LAB — GLUCOSE BLDC GLUCOMTR-MCNC: 98

## 2025-09-04 PROCEDURE — 99205 OFFICE O/P NEW HI 60 MIN: CPT

## 2025-09-04 PROCEDURE — 82962 GLUCOSE BLOOD TEST: CPT

## 2025-09-04 PROCEDURE — G2211 COMPLEX E/M VISIT ADD ON: CPT

## 2025-09-04 RX ORDER — INSULIN GLARGINE 100 [IU]/ML
100 INJECTION, SOLUTION SUBCUTANEOUS AT BEDTIME
Refills: 0 | Status: ACTIVE | COMMUNITY
Start: 2025-09-04

## 2025-09-04 RX ORDER — BLOOD-GLUCOSE SENSOR
EACH MISCELLANEOUS
Qty: 3 | Refills: 5 | Status: ACTIVE | COMMUNITY
Start: 2025-09-04 | End: 1900-01-01

## 2025-09-04 RX ORDER — BLOOD SUGAR DIAGNOSTIC
STRIP MISCELLANEOUS
Qty: 100 | Refills: 5 | Status: ACTIVE | COMMUNITY
Start: 2025-09-04 | End: 1900-01-01

## 2025-09-04 RX ORDER — BLOOD-GLUCOSE METER
W/DEVICE EACH MISCELLANEOUS
Qty: 1 | Refills: 0 | Status: ACTIVE | COMMUNITY
Start: 2025-09-04 | End: 1900-01-01

## 2025-09-04 RX ORDER — LANCETS
EACH MISCELLANEOUS
Qty: 100 | Refills: 5 | Status: ACTIVE | COMMUNITY
Start: 2025-09-04 | End: 1900-01-01

## 2025-09-04 RX ORDER — DULAGLUTIDE 0.75 MG/.5ML
0.75 INJECTION, SOLUTION SUBCUTANEOUS
Qty: 4 | Refills: 0 | Status: ACTIVE | COMMUNITY
Start: 2025-09-04 | End: 1900-01-01

## 2025-09-06 LAB
25(OH)D3 SERPL-MCNC: 20 NG/ML
ALBUMIN SERPL ELPH-MCNC: 4.2 G/DL
ALP BLD-CCNC: 75 U/L
ALT SERPL-CCNC: 18 U/L
ANION GAP SERPL CALC-SCNC: 16 MMOL/L
APPEARANCE: CLEAR
AST SERPL-CCNC: 21 U/L
BACTERIA: NEGATIVE /HPF
BILIRUB SERPL-MCNC: 0.3 MG/DL
BILIRUBIN URINE: NEGATIVE
BLOOD URINE: NEGATIVE
BUN SERPL-MCNC: 62 MG/DL
CALCIUM SERPL-MCNC: 10.4 MG/DL
CAST: NORMAL /LPF
CHLORIDE SERPL-SCNC: 105 MMOL/L
CO2 SERPL-SCNC: 18 MMOL/L
COLOR: YELLOW
CREAT SERPL-MCNC: 1.8 MG/DL
CREAT SPEC-SCNC: 126 MG/DL
CREAT/PROT UR: 0.1 RATIO
CYSTATIN C SERPL-MCNC: 2.25 MG/L
EGFRCR SERPLBLD CKD-EPI 2021: 39 ML/MIN/1.73M2
EPITHELIAL CELLS: 1 /HPF
GFR/BSA.PRED SERPLBLD CYS-BASED-ARV: 25 ML/MIN/1.73M2
GLUCOSE QUALITATIVE U: NEGATIVE MG/DL
GLUCOSE SERPL-MCNC: 101 MG/DL
HCT VFR BLD CALC: 35.3 %
HGB BLD-MCNC: 11.6 G/DL
KETONES URINE: NEGATIVE MG/DL
LEUKOCYTE ESTERASE URINE: ABNORMAL
MCHC RBC-ENTMCNC: 29.3 PG
MCHC RBC-ENTMCNC: 32.9 G/DL
MCV RBC AUTO: 89.1 FL
MICROSCOPIC-UA: NORMAL
NITRITE URINE: NEGATIVE
PH URINE: 5.5
PHOSPHATE SERPL-MCNC: 3.8 MG/DL
PLATELET # BLD AUTO: 272 K/UL
POTASSIUM SERPL-SCNC: 4.8 MMOL/L
PROT SERPL-MCNC: 7 G/DL
PROT UR-MCNC: 6 MG/DL
PROTEIN URINE: NEGATIVE MG/DL
RBC # BLD: 3.96 M/UL
RBC # FLD: 16.8 %
RED BLOOD CELLS URINE: NORMAL /HPF
REVIEW: NORMAL
SODIUM SERPL-SCNC: 139 MMOL/L
SPECIFIC GRAVITY URINE: 1.02
UROBILINOGEN URINE: 0.2 MG/DL
WBC # FLD AUTO: 5.09 K/UL
WHITE BLOOD CELLS URINE: 1 /HPF

## 2025-09-09 DIAGNOSIS — N18.32 CHRONIC KIDNEY DISEASE, STAGE 3B: ICD-10-CM

## 2025-09-09 RX ORDER — SODIUM BICARBONATE 650 MG/1
650 TABLET ORAL 3 TIMES DAILY
Qty: 270 | Refills: 3 | Status: ACTIVE | COMMUNITY
Start: 2025-09-09 | End: 1900-01-01

## 2025-09-12 ENCOUNTER — APPOINTMENT (OUTPATIENT)
Dept: CARDIOLOGY | Facility: CLINIC | Age: 75
End: 2025-09-12
Payer: MEDICARE

## 2025-09-12 VITALS
DIASTOLIC BLOOD PRESSURE: 60 MMHG | WEIGHT: 212 LBS | BODY MASS INDEX: 32.13 KG/M2 | OXYGEN SATURATION: 99 % | HEART RATE: 95 BPM | SYSTOLIC BLOOD PRESSURE: 100 MMHG | HEIGHT: 68 IN

## 2025-09-12 PROCEDURE — 99214 OFFICE O/P EST MOD 30 MIN: CPT

## 2025-09-12 PROCEDURE — 93308 TTE F-UP OR LMTD: CPT

## 2025-09-16 ENCOUNTER — APPOINTMENT (OUTPATIENT)
Dept: HEART AND VASCULAR | Facility: CLINIC | Age: 75
End: 2025-09-16
Payer: MEDICARE

## 2025-09-16 VITALS
HEIGHT: 68 IN | OXYGEN SATURATION: 98 % | DIASTOLIC BLOOD PRESSURE: 60 MMHG | BODY MASS INDEX: 32.28 KG/M2 | HEART RATE: 91 BPM | WEIGHT: 213 LBS | SYSTOLIC BLOOD PRESSURE: 90 MMHG

## 2025-09-16 LAB
ALBUMIN SERPL ELPH-MCNC: 4 G/DL
ALP BLD-CCNC: 90 U/L
ALT SERPL-CCNC: 24 U/L
ANION GAP SERPL CALC-SCNC: 12 MMOL/L
APTT BLD: 33.1 SEC
AST SERPL-CCNC: 25 U/L
BILIRUB SERPL-MCNC: 0.3 MG/DL
BUN SERPL-MCNC: 54 MG/DL
CALCIUM SERPL-MCNC: 9.9 MG/DL
CHLORIDE SERPL-SCNC: 103 MMOL/L
CHOLEST SERPL-MCNC: 190 MG/DL
CO2 SERPL-SCNC: 24 MMOL/L
CREAT SERPL-MCNC: 1.83 MG/DL
EGFRCR SERPLBLD CKD-EPI 2021: 38 ML/MIN/1.73M2
ESTIMATED AVERAGE GLUCOSE: 183 MG/DL
GLUCOSE SERPL-MCNC: 176 MG/DL
HBA1C MFR BLD HPLC: 8 %
HCT VFR BLD CALC: 34.6 %
HDLC SERPL-MCNC: 41 MG/DL
HGB BLD-MCNC: 11.1 G/DL
INR PPP: 1.02 RATIO
LDLC SERPL-MCNC: 136 MG/DL
MCHC RBC-ENTMCNC: 29.1 PG
MCHC RBC-ENTMCNC: 32.1 G/DL
MCV RBC AUTO: 90.6 FL
NONHDLC SERPL-MCNC: 149 MG/DL
PLATELET # BLD AUTO: 284 K/UL
POTASSIUM SERPL-SCNC: 4.8 MMOL/L
PROT SERPL-MCNC: 7.4 G/DL
PT BLD: 11.8 SEC
RBC # BLD: 3.82 M/UL
RBC # FLD: 17.7 %
SODIUM SERPL-SCNC: 139 MMOL/L
TRIGL SERPL-MCNC: 74 MG/DL
WBC # FLD AUTO: 5.99 K/UL

## 2025-09-16 PROCEDURE — 99204 OFFICE O/P NEW MOD 45 MIN: CPT
